# Patient Record
Sex: MALE | Race: BLACK OR AFRICAN AMERICAN | Employment: FULL TIME | URBAN - METROPOLITAN AREA
[De-identification: names, ages, dates, MRNs, and addresses within clinical notes are randomized per-mention and may not be internally consistent; named-entity substitution may affect disease eponyms.]

---

## 2017-04-29 ENCOUNTER — APPOINTMENT (OUTPATIENT)
Dept: GENERAL RADIOLOGY | Age: 22
End: 2017-04-29
Attending: PHYSICIAN ASSISTANT
Payer: SELF-PAY

## 2017-04-29 ENCOUNTER — HOSPITAL ENCOUNTER (EMERGENCY)
Age: 22
Discharge: HOME | End: 2017-04-29
Attending: EMERGENCY MEDICINE
Payer: SELF-PAY

## 2017-04-29 VITALS
SYSTOLIC BLOOD PRESSURE: 159 MMHG | HEIGHT: 72 IN | WEIGHT: 215 LBS | DIASTOLIC BLOOD PRESSURE: 97 MMHG | TEMPERATURE: 98.3 F | BODY MASS INDEX: 29.12 KG/M2 | HEART RATE: 59 BPM | RESPIRATION RATE: 22 BRPM | OXYGEN SATURATION: 100 %

## 2017-04-29 DIAGNOSIS — S43.401A SPRAIN OF RIGHT SHOULDER, UNSPECIFIED SHOULDER SPRAIN TYPE, INITIAL ENCOUNTER: Primary | ICD-10-CM

## 2017-04-29 PROCEDURE — 73030 X-RAY EXAM OF SHOULDER: CPT

## 2017-04-29 PROCEDURE — 73000 X-RAY EXAM OF COLLAR BONE: CPT

## 2017-04-29 PROCEDURE — 74011250637 HC RX REV CODE- 250/637: Performed by: PHYSICIAN ASSISTANT

## 2017-04-29 PROCEDURE — 99283 EMERGENCY DEPT VISIT LOW MDM: CPT

## 2017-04-29 RX ORDER — IBUPROFEN 800 MG/1
800 TABLET ORAL
Qty: 7 TAB | Refills: 0 | Status: SHIPPED | OUTPATIENT
Start: 2017-04-29 | End: 2017-07-13

## 2017-04-29 RX ORDER — HYDROCODONE BITARTRATE AND ACETAMINOPHEN 5; 325 MG/1; MG/1
1 TABLET ORAL
Status: COMPLETED | OUTPATIENT
Start: 2017-04-29 | End: 2017-04-29

## 2017-04-29 RX ADMIN — HYDROCODONE BITARTRATE AND ACETAMINOPHEN 1 TABLET: 5; 325 TABLET ORAL at 22:38

## 2017-04-30 NOTE — ED NOTES
Patient verbalized name and date of birth. Name and date of birth compared to chart and armband to validate information. Patient verbalized that arm armband contains correct spelling of name and date of birth.

## 2017-04-30 NOTE — ED PROVIDER NOTES
Patient is a 22 y.o. male presenting with shoulder pain. The history is provided by the patient. Shoulder Pain    The incident occurred less than 1 hour ago (Acute Rt shoulder pain after hitting someone with shoulder at football. ). The injury mechanism was a direct blow. The right shoulder is affected. The pain is at a severity of 10/10. The pain has been constant since onset. The pain does not radiate. There is no history of shoulder injury. He has no other injuries. There is no history of shoulder surgery. Pertinent negatives include no numbness, no muscle weakness and no tingling. History reviewed. No pertinent past medical history. History reviewed. No pertinent surgical history. History reviewed. No pertinent family history. Social History     Social History    Marital status: SINGLE     Spouse name: N/A    Number of children: N/A    Years of education: N/A     Occupational History    Not on file. Social History Main Topics    Smoking status: Never Smoker    Smokeless tobacco: Not on file    Alcohol use No    Drug use: Yes     Special: Marijuana      Comment: last use 2/1/2015    Sexual activity: Yes     Partners: Female     Birth control/ protection: Condom     Other Topics Concern    Not on file     Social History Narrative         ALLERGIES: Shellfish derived    Review of Systems   Constitutional: Negative for activity change, appetite change, chills, fatigue and fever. HENT: Negative. Respiratory: Negative. Negative for cough and shortness of breath. Cardiovascular: Negative. Negative for chest pain. Gastrointestinal: Negative. Negative for abdominal pain. Musculoskeletal: Positive for arthralgias and myalgias. Negative for back pain, gait problem, joint swelling, neck pain and neck stiffness. Skin: Negative. Negative for color change, pallor, rash and wound. Neurological: Negative for tingling, numbness and headaches. Psychiatric/Behavioral: Negative. Vitals:    04/29/17 2213   BP: (!) 159/97   Pulse: (!) 59   Resp: 22   Temp: 98.3 °F (36.8 °C)   SpO2: 100%   Weight: 97.5 kg (215 lb)   Height: 6' (1.829 m)            Physical Exam   Constitutional: He is oriented to person, place, and time. He appears well-developed and well-nourished. No distress. HENT:   Head: Normocephalic and atraumatic. Right Ear: Hearing and external ear normal.   Left Ear: Hearing and external ear normal.   Nose: Nose normal.   Eyes: Conjunctivae and EOM are normal. Pupils are equal, round, and reactive to light. Neck: Normal range of motion. Neck supple. Pulmonary/Chest: Effort normal. No accessory muscle usage. No respiratory distress. Musculoskeletal:        Right shoulder: He exhibits decreased range of motion, tenderness, bony tenderness and pain. He exhibits no swelling, no effusion, no crepitus, no deformity, no laceration, no spasm, normal pulse and normal strength. Left shoulder: Normal.        Right elbow: Normal.       Right wrist: Normal.        Arms:  Mild deformity/ protrusion of Rt clavicle with significant TTP. Neurological: He is alert and oriented to person, place, and time. No cranial nerve deficit. Skin: Skin is warm, dry and intact. He is not diaphoretic. No pallor. Psychiatric: He has a normal mood and affect. His speech is normal and behavior is normal. Judgment and thought content normal.   Nursing note and vitals reviewed. MDM  Number of Diagnoses or Management Options  Sprain of right shoulder, unspecified shoulder sprain type, initial encounter:   Diagnosis management comments: DDx: fx, dislocation, sprain, strain, contusion    LABORATORY TESTS:  No results found for this or any previous visit (from the past 12 hour(s)). IMAGING RESULTS:  XR CLAVICLE RT   Final Result  FINDINGS: Two views of the right clavicle demonstrate no fracture.     IMPRESSION  IMPRESSION: No acute abnormality.       XR SHOULDER RT AP/LAT MIN 2 V Final Result   FINDINGS: Three views of the right shoulder demonstrate no fracture, dislocation  or other acute abnormality.     IMPRESSION  IMPRESSION: No acute abnormality. MEDICATIONS GIVEN:  Medications  HYDROcodone-acetaminophen (NORCO) 5-325 mg per tablet 1 Tab (1 Tab Oral Given 4/29/17 4059)    IMPRESSION:  Sprain of right shoulder, unspecified shoulder sprain type, initial encounter  (primary encounter diagnosis)    PLAN:  1. Current Discharge Medication List    CONTINUE these medications which have CHANGED    ibuprofen (MOTRIN) 800 mg tablet  Take 1 Tab by mouth every eight (8) hours as needed for Pain for up to 20 doses. Qty: 7 Tab Refills: 0        2. Follow-up Information     Follow up With Details Comments Contact Info    [facility] Schedule an appointment as soon as possible for a visit in 1   week As needed, If symptoms worsen [address] [phone] [phone]    [facility] Schedule an appointment as soon as possible for a   visit in 1 week As needed, If symptoms worsen [address] [facility]   [phone]      Return to ED if worse                  Amount and/or Complexity of Data Reviewed  Tests in the radiology section of CPT®: ordered and reviewed  Tests in the medicine section of CPT®: ordered and reviewed    Patient Progress  Patient progress: stable    ED Course       Procedures    11:29 PM  I have discussed with patient their diagnosis, treatment, and follow up plan. The patient agrees to follow up as outlined in discharge paperwork and also to return to the ED with any worsening.  Sawyer Chen PA-C

## 2017-04-30 NOTE — ED NOTES
Pt presents to ED ambulatory with complaint(s) of right collarbone pain s/p football injury that occurred at approx. 2030 this evening. Pt reports limited movement of right arm. Pt also reports pressure-like pain on right side of chest. Pt denies any fever, nausea, vomiting, or diarrhea. Pt denies taking any medication for pain relief. Upon arrival, pt's collarbones appear assymetrical, right collarbone appears to protrude out more than the left. Pt is alert and oriented x4 and in no apparent distress. Emergency Department Nursing Plan of Care       The Nursing Plan of Care is developed from the Nursing assessment and Emergency Department Attending provider initial evaluation. The plan of care may be reviewed in the ED Provider note.     The Plan of Care was developed with the following considerations:   Patient / Family readiness to learn indicated by:verbalized understanding  Persons(s) to be included in education: patient  Barriers to Learning/Limitations:No    Signed     Joann Coleman RN    4/29/2017   10:22 PM

## 2017-04-30 NOTE — DISCHARGE INSTRUCTIONS
Shoulder Sprain: Care Instructions  Your Care Instructions    A shoulder sprain occurs when you stretch or tear a ligament in your shoulder. Ligaments are tough tissues that connect one bone to another. A sprain can happen during sports, a fall, or projects around the house. Shoulder sprains usually get better with treatment at home. Follow-up care is a key part of your treatment and safety. Be sure to make and go to all appointments, and call your doctor if you are having problems. It's also a good idea to know your test results and keep a list of the medicines you take. How can you care for yourself at home? · Rest and protect your shoulder. Try to stop or reduce any action that causes pain. · If your doctor gave you a sling or immobilizer, wear it as directed. A sling or immobilizer supports your shoulder and may make you more comfortable. · Put ice or a cold pack on your shoulder for 10 to 20 minutes at a time. Try to do this every 1 to 2 hours for the next 3 days (when you are awake) or until the swelling goes down. Put a thin cloth between the ice and your skin. Some doctors suggest alternating between hot and cold. · Be safe with medicines. Read and follow all instructions on the label. ¨ If the doctor gave you a prescription medicine for pain, take it as prescribed. ¨ If you are not taking a prescription pain medicine, ask your doctor if you can take an over-the-counter medicine. · For the first day or two after an injury, avoid things that might increase swelling, such as hot showers, hot tubs, or hot packs. · After 2 or 3 days, if your swelling is gone, apply a heating pad set on low or a warm cloth to your shoulder. This helps keep your shoulder flexible. Some doctors suggest that you go back and forth between hot and cold. Put a thin cloth between the heating pad and your skin. · Follow your doctor's or physical therapist's directions for exercises.   · Return to your usual level of activity slowly. When should you call for help? Call your doctor now or seek immediate medical care if:  · Your pain is worse. · You cannot move your shoulder. · Your arm is cool or pale or changes color below the shoulder. · You have tingling, weakness, or numbness in your arm. Watch closely for changes in your health, and be sure to contact your doctor if:  · You do not get better as expected. Where can you learn more? Go to http://kiley-crista.info/. Enter C076 in the search box to learn more about \"Shoulder Sprain: Care Instructions. \"  Current as of: May 23, 2016  Content Version: 11.2  © 1537-0651 [employer]. Care instructions adapted under license by [employer] (which disclaims liability or warranty for this information). If you have questions about a medical condition or this instruction, always ask your healthcare professional. [address] any warranty or liability for your use of this information.

## 2017-04-30 NOTE — ED NOTES
Discharge Instructions Reviewed with patient. Discharge instructions given to patient per this nurse. patient able to return verbalize discharge instructions. Paper copy of discharge instructions given. 1 RX given to patient per this nurse. Patient condition stable, Respiratory status WNL, Neurostatus intact. patient out of er, to home with friend.

## 2017-07-13 ENCOUNTER — APPOINTMENT (OUTPATIENT)
Dept: GENERAL RADIOLOGY | Age: 22
End: 2017-07-13
Attending: EMERGENCY MEDICINE
Payer: SELF-PAY

## 2017-07-13 ENCOUNTER — HOSPITAL ENCOUNTER (EMERGENCY)
Age: 22
Discharge: HOME | End: 2017-07-13
Attending: EMERGENCY MEDICINE | Admitting: EMERGENCY MEDICINE
Payer: SELF-PAY

## 2017-07-13 VITALS
HEIGHT: 72 IN | DIASTOLIC BLOOD PRESSURE: 86 MMHG | BODY MASS INDEX: 29.12 KG/M2 | SYSTOLIC BLOOD PRESSURE: 140 MMHG | OXYGEN SATURATION: 100 % | WEIGHT: 215 LBS | TEMPERATURE: 98 F | RESPIRATION RATE: 16 BRPM | HEART RATE: 72 BPM

## 2017-07-13 DIAGNOSIS — S93.601A RIGHT FOOT SPRAIN, INITIAL ENCOUNTER: Primary | ICD-10-CM

## 2017-07-13 PROCEDURE — L1902 AFO ANKLE GAUNTLET PRE OTS: HCPCS

## 2017-07-13 PROCEDURE — 99284 EMERGENCY DEPT VISIT MOD MDM: CPT

## 2017-07-13 PROCEDURE — 74011250637 HC RX REV CODE- 250/637: Performed by: EMERGENCY MEDICINE

## 2017-07-13 PROCEDURE — 73630 X-RAY EXAM OF FOOT: CPT

## 2017-07-13 RX ORDER — NAPROXEN 375 MG/1
375 TABLET ORAL 2 TIMES DAILY WITH MEALS
Qty: 20 TAB | Refills: 0 | Status: SHIPPED | OUTPATIENT
Start: 2017-07-13 | End: 2017-08-12

## 2017-07-13 RX ORDER — NAPROXEN 250 MG/1
250 TABLET ORAL ONCE
Status: COMPLETED | OUTPATIENT
Start: 2017-07-13 | End: 2017-07-13

## 2017-07-13 RX ORDER — TRAMADOL HYDROCHLORIDE AND ACETAMINOPHEN 37.5; 325 MG/1; MG/1
1 TABLET ORAL
Qty: 14 TAB | Refills: 0 | Status: SHIPPED | OUTPATIENT
Start: 2017-07-13 | End: 2017-08-12

## 2017-07-13 RX ORDER — TRAMADOL HYDROCHLORIDE 50 MG/1
50 TABLET ORAL
Status: COMPLETED | OUTPATIENT
Start: 2017-07-13 | End: 2017-07-13

## 2017-07-13 RX ADMIN — TRAMADOL HYDROCHLORIDE 50 MG: 50 TABLET, FILM COATED ORAL at 07:52

## 2017-07-13 RX ADMIN — NAPROXEN 250 MG: 250 TABLET ORAL at 07:53

## 2017-07-13 NOTE — LETTER
[facility] EMERGENCY DEPT 
[address] [place] [phone] 
[phone] Work/School Note Date: 7/13/2017 To Whom It May concern: 
 
Rosario Minaya was seen and treated today in the emergency room by the following provider(s): 
Attending Provider: Liza Khanna MD.   
 
Rosario Minaya may return to work on 07/17/2017. Sincerely, Liza Khanna MD

## 2017-07-13 NOTE — Clinical Note
[facility] SYSTEMS Departments For adult and child immunizations, family planning, TB screening, STD testing and women's health services. [facility]: [place] [phone] [employer] [phone] [facility]   [phone] [place] [facility]   [phone] Aysha Porter   [phone] [place]: [facility] [phone] [place] [phone] [place] [phone] [address] For primary care services, woman and child wellness, and some clinic s providing specialty care. [facility] -- [facility]. [address] [place] [address] [phone] [facility] 200 [facility] [phone] [address] [phone] [address] [place] [address] [address] [place] [address]  [phone] [facility]3 [facility] [phone] [facility] 81 [facility] [phone] [facility] 1051 [facility], 9 [facility]: [facility] [address], ext [phone] [facility], [address] [phone] [facility] 37 [phone] [address] 5850 [facility]  [phone] [employer]  [address], [phone] [facility]6 [facility] ([email or web address]) [phone] Sexual Health/Woman Wellness Clinics For STD/HIV testing and treatment, pregnancy testing and services, men's health, birth control services and hepatitis/HPV vaccine services. [facility] 40 [address] [phone] [facility] [address] [phone] [place] [address] [phone] [address] [place] [address] Rd, 5th floor [phone] [facility] [address] [facility] [phone] [facility] [employer] [facility] [employer] [address] [employer] [phone] Specialty Service Clinics 112 [facility] [phone] 6655 [facility]   [phone] Halley Winters 40   [phone] Women, Infant and Children's Services: [address] [phone] [address] [phone] [address] [phone] [facility] Psychiatry     [phone] [facility]   [phone] 8 [facility] [phone] [facility] [phone] SUYAPA Pedro M.D. Gaylyn Heinrich, M.D. Raeann Ruddle, M.D. [address], MD Lovely Lovings, FNP GEORGINA Elam, GEORGINA Ardon MD [facility], NP Mallory Fay, [address] [phone] Rj Ocampo MD [facility] [phone] MD Jose Alfredo Jack MD Cain Jakes, MD Stevenson Coots, MD Clerance Pacini, MD        [phone] Reji Patel MD               [phone] Norman Hoffmann MD      [phone] [facility] [phone] MD Marissa Bennett MD Dickie Polka, MD 
 1059 [facility] [phone]

## 2017-07-13 NOTE — DISCHARGE INSTRUCTIONS
Foot Sprain: Care Instructions  Your Care Instructions    A foot sprain occurs when you stretch or tear the ligaments around your foot. Ligaments are the tough tissues that connect one bone to another. A sprain can happen when you run, fall, or hit your toe against something. Sprains often happen when you jump or change direction quickly. This may occur when you play basketball, soccer, or other sports. Most foot sprains will get better with treatment at home. Follow-up care is a key part of your treatment and safety. Be sure to make and go to all appointments, and call your doctor if you are having problems. It's also a good idea to know your test results and keep a list of the medicines you take. How can you care for yourself at home? · Walk or put weight on your sprained foot as long as it does not hurt. · If your doctor gave you a splint or immobilizer, wear it as directed. If you were given crutches, use them as directed. · For the first 2 days after your injury, avoid hot showers, hot tubs, or hot packs. They may increase swelling. · Put ice or a cold pack on your foot for 10 to 20 minutes at a time to stop swelling. Try this every 1 to 2 hours for 3 days (when you are awake) or until the swelling goes down. Put a thin cloth between the ice pack and your skin. Keep your splint dry. · After 2 or 3 days, if your swelling is gone, put a heating pad (set on low) or a warm cloth on your foot. Some doctors suggest that you go back and forth between hot and cold treatments. · Prop up your foot on a pillow when you ice it or anytime you sit or lie down. Try to keep it above the level of your heart. This will help reduce swelling. · Take pain medicines exactly as directed. ¨ If the doctor gave you a prescription medicine for pain, take it as prescribed. ¨ If you are not taking a prescription pain medicine, ask your doctor if you can take an over-the-counter medicine.   · Do any exercises that your doctor or physical therapist suggests. · Return to your usual exercise gradually as you feel better. When should you call for help? Call your doctor now or seek immediate medical care if:  · You have increased or severe pain. · Your toes are cool or pale or change color. · Your wrap or splint feels too tight. · You have signs of a blood clot, such as:  ¨ Pain in your calf, back of the knee, thigh, or groin. ¨ Redness and swelling in your leg or groin. · You have tingling, weakness, or numbness in your leg or foot. Watch closely for changes in your health, and be sure to contact your doctor if:  · You cannot put any weight on your foot. · You get a fever. · You do not get better as expected. Where can you learn more? Go to http://kiley-crista.info/. Enter H867 in the search box to learn more about \"Foot Sprain: Care Instructions. \"  Current as of: March 21, 2017  Content Version: 11.3  © 9968-7455 [employer]. Care instructions adapted under license by [employer] (which disclaims liability or warranty for this information). If you have questions about a medical condition or this instruction, always ask your healthcare professional. [address] any warranty or liability for your use of this information. Foot Sprain (Metatarsophalangeal Joint): Rehab Exercises  Your Care Instructions  Here are some examples of typical rehabilitation exercises for your condition. Start each exercise slowly. Ease off the exercise if you start to have pain. Your doctor or physical therapist will tell you when you can start these exercises and which ones will work best for you. How to do the exercises  Great toe extension stretch    1. Sit in a chair, and put your affected foot across your other knee. 2. Grasp your heel with one hand, and then slowly pull your big toe back with your other hand.  Pull your toe back toward your ankle until you feel a stretch along the bottom of your foot. 3. Hold the stretch for at least 15 to 30 seconds. 4. Repeat 2 to 4 times. Great toe flexion stretch    1. Sit in a chair, and put your affected foot across your other knee. 2. Grasp your heel with one hand, and then slowly push your big toe down with your other hand. Push your toe down and away from your ankle until you feel a stretch along the top of your foot. 3. Hold the stretch for at least 15 to 30 seconds. 4. Repeat 2 to 4 times. Great toe traction stretch    1. Sit in a chair, and put your affected foot across your other knee. 2. Grasp your heel and the middle part of your foot with one hand. With your other hand, use your thumb and middle finger to grasp your big toe and gently pull it straight out and away from your foot. 3. Hold the stretch for at least 15 to 30 seconds. 4. Repeat 2 to 4 times. Forefoot stretch    1. Sit in a chair with your affected leg bent, and put the heel of your affected foot on the edge of the seat. Or you can sit on the floor with your leg bent and your heel digging into the floor. 2. Grasp your foot with both hands. Your thumbs will be on the top side of your foot just below the joints where your toes connect to your foot. Your other fingers will be underneath your foot. 3. Use the fingers underneath your foot to push up on the two toes that are closest to your big toe. Then use your thumbs and hands to spread your foot and toes outward until you feel a stretch in your foot. The outer edges of your foot will curve downward as you push up on the middle toes. Hold the stretch for at least 15 to 30 seconds. 4. Next, slowly press your thumbs down on the two toes that are closest to your big toe. The outer edges of your foot will curve upward. Hold the stretch for at least 15 to 30 seconds until you feel a stretch in your foot. 5. Repeat 2 to 4 times. Towel scrunches    1.  Sit in a chair, and place both feet on a towel on the floor.  2. Scrunch the towel toward you with your toes. Then use your toes to push the towel back into place. 3. Repeat 8 to 12 times. [place] pick-ups    1. Put some marbles on the floor next to a cup.  2. Sit in a chair, and use the toes of your affected foot to lift up one marble from the floor at a time. Then try to put the marble in the cup.  3. Repeat 8 to 12 times. Follow-up care is a key part of your treatment and safety. Be sure to make and go to all appointments, and call your doctor if you are having problems. It's also a good idea to know your test results and keep a list of the medicines you take. Where can you learn more? Go to http://kiley-crista.info/. Enter Q519 in the search box to learn more about \"Foot Sprain (Metatarsophalangeal Joint): Rehab Exercises. \"  Current as of: March 21, 2017  Content Version: 11.3  © 5388-9266 [employer]. Care instructions adapted under license by [employer] (which disclaims liability or warranty for this information). If you have questions about a medical condition or this instruction, always ask your healthcare professional. [address] any warranty or liability for your use of this information.

## 2017-07-13 NOTE — ED PROVIDER NOTES
HPI Comments: Omar Beard, 25 y.o. male, with no significant PMHx presents ambulatory to [facility] ED with cc of right frontal foot pain and swelling since 2 days ago. Pt states that he was playing basketball 2 days ago but denies any falls, twists, or trauma to the area. He states that the swelling and pain began when he was at home 2 days ago. He denies taking pain medications. He denies any hx of diabetes, arthritis or gout. PCP: None    Social history significant for: - Tobacco, - EtOH, - Illicit Drug Use    There are no other complaints, changes, or physical findings at this time. The history is provided by the patient. No  was used. History reviewed. No pertinent past medical history. History reviewed. No pertinent surgical history. History reviewed. No pertinent family history. Social History     Social History    Marital status: SINGLE     Spouse name: N/A    Number of children: N/A    Years of education: N/A     Occupational History    Not on file. Social History Main Topics    Smoking status: Never Smoker    Smokeless tobacco: Never Used    Alcohol use No    Drug use: No      Comment: last use 2/1/2015    Sexual activity: Yes     Partners: Male     Other Topics Concern    Not on file     Social History Narrative         ALLERGIES: Shellfish derived    Review of Systems   Constitutional: Negative for fever. Respiratory: Negative for shortness of breath. Cardiovascular: Negative for chest pain and leg swelling. Gastrointestinal: Negative for abdominal pain, nausea and vomiting. Musculoskeletal: Positive for arthralgias (right foot), joint swelling (right foot) and myalgias. Negative for back pain. Skin: Negative for color change and rash. Neurological: Negative for weakness and numbness.      Patient Vitals for the past 12 hrs:   Temp Pulse Resp BP SpO2   07/13/17 0738 98 °F (36.7 °C) 72 16 140/86 100 %       Physical Exam Constitutional: He is oriented to person, place, and time. He appears well-developed and well-nourished. Cardiovascular: Normal rate, regular rhythm, normal heart sounds and intact distal pulses. Pulmonary/Chest: Effort normal and breath sounds normal.   Abdominal: Soft. There is no tenderness. Musculoskeletal:   + Right midfoot tenderness over dorsum  Ankle involved, toes not involved  No bruising noted  DP pulses intact  No breaks in skin   Neurological: He is alert and oriented to person, place, and time. Skin: Skin is warm and dry. Nursing note and vitals reviewed. MDM  Number of Diagnoses or Management Options  Diagnosis management comments: DDx: contusion, sprain, fasciitis. Amount and/or Complexity of Data Reviewed  Tests in the radiology section of CPT®: ordered and reviewed  Review and summarize past medical records: yes  Independent visualization of images, tracings, or specimens: yes    Patient Progress  Patient progress: stable    ED Course       Procedures    IMAGING RESULTS:  XR FOOT RT MIN 3 V   Final Result   EXAM:  XR FOOT RT MIN 3 V     INDICATION:   1-2 days of right foot pain, unknown injury.     COMPARISON:  None.     FINDINGS:  Three views of the right foot demonstrate no fracture or other acute  osseous or articular abnormality. The soft tissues are within normal limits.     IMPRESSION  IMPRESSION:  No acute abnormality. MEDICATIONS GIVEN:  Medications   naproxen (NAPROSYN) tablet 250 mg (250 mg Oral Given 7/13/17 0753)   traMADol (ULTRAM) tablet 50 mg (50 mg Oral Given 7/13/17 0752)       IMPRESSION:  1. Right foot sprain, initial encounter        PLAN:  1. Current Discharge Medication List      START taking these medications    Details   traMADol-acetaminophen (ULTRACET) 37.5-325 mg per tablet Take 1 Tab by mouth every six (6) hours as needed for Pain. Max Daily Amount: 4 Tabs.   Qty: 14 Tab, Refills: 0      naproxen (NAPROSYN) 375 mg tablet Take 1 Tab by mouth two (2) times daily (with meals). Qty: 20 Tab, Refills: 0           2. Follow-up Information     Follow up With Details Comments Contact Info    [facility] EMERGENCY DEPT  If symptoms worsen [address] Schedule an appointment as soon as possible for a visit If symptoms worsen [address]  [phone]        Return to ED if worse     Discharge Note:  8:26 AM  The pt is ready for discharge. The pt's signs, symptoms, diagnosis, and discharge instructions have been discussed and pt has conveyed their understanding. The pt is to follow up as recommended or return to ER should their symptoms worsen. Plan has been discussed and pt is in agreement. This note is prepared by Tamika Arvizu, acting as a Scribe for Cam Ontiveros MD.    Cam Ontiveros MD: The scribe's documentation has been prepared under my direction and personally reviewed by me in its entirety. I confirm that the notes above accurately reflects all work, treatment, procedures, and medical decision making performed by me.

## 2017-07-13 NOTE — LETTER
[facility] EMERGENCY DEPT 
[address] [facility] [address] [id number] [phone] Work/School Note Date: 7/13/2017 To Whom It May concern: 
 
Eulalio Espinal was seen and treated today in the emergency room by the following provider(s): 
Attending Provider: Zonia Farr MD.   
 
Eulalio Espinal may return to work on 07/19/17 or sooner if better. Sincerely, Zonia Farr MD

## 2017-07-13 NOTE — ED NOTES
Emergency Department Nursing Plan of Care       The Nursing Plan of Care is developed from the Nursing assessment and Emergency Department Attending provider initial evaluation. The plan of care may be reviewed in the ED Provider note.     The Plan of Care was developed with the following considerations:   Patient / Family readiness to learn indicated by:verbalized understanding  Persons(s) to be included in education: patient  Barriers to Learning/Limitations:No    Signed     Isidro Heart RN    7/13/2017   7:41 AM

## 2017-07-13 NOTE — ED TRIAGE NOTES
Right foot pain x 1-2 days, denies specific injury/trauma, no obvious deformities noted (shoe still on)

## 2017-08-12 ENCOUNTER — APPOINTMENT (OUTPATIENT)
Dept: GENERAL RADIOLOGY | Age: 22
End: 2017-08-12
Attending: INTERNAL MEDICINE
Payer: SELF-PAY

## 2017-08-12 ENCOUNTER — HOSPITAL ENCOUNTER (EMERGENCY)
Age: 22
Discharge: ANOTHER HOSPITAL | End: 2017-08-12
Attending: INTERNAL MEDICINE
Payer: SELF-PAY

## 2017-08-12 VITALS
RESPIRATION RATE: 17 BRPM | OXYGEN SATURATION: 99 % | WEIGHT: 215 LBS | DIASTOLIC BLOOD PRESSURE: 96 MMHG | HEIGHT: 72 IN | HEART RATE: 88 BPM | BODY MASS INDEX: 29.12 KG/M2 | SYSTOLIC BLOOD PRESSURE: 158 MMHG | TEMPERATURE: 98.6 F

## 2017-08-12 DIAGNOSIS — S62.91XB: Primary | ICD-10-CM

## 2017-08-12 PROCEDURE — 74011250636 HC RX REV CODE- 250/636: Performed by: INTERNAL MEDICINE

## 2017-08-12 PROCEDURE — 74011250637 HC RX REV CODE- 250/637: Performed by: INTERNAL MEDICINE

## 2017-08-12 PROCEDURE — 75810000121 HC INCSN/RMVL FB ANY OTHER SITE

## 2017-08-12 PROCEDURE — 74011000250 HC RX REV CODE- 250: Performed by: INTERNAL MEDICINE

## 2017-08-12 PROCEDURE — 73130 X-RAY EXAM OF HAND: CPT

## 2017-08-12 PROCEDURE — 90471 IMMUNIZATION ADMIN: CPT

## 2017-08-12 PROCEDURE — 99284 EMERGENCY DEPT VISIT MOD MDM: CPT

## 2017-08-12 PROCEDURE — 90715 TDAP VACCINE 7 YRS/> IM: CPT | Performed by: INTERNAL MEDICINE

## 2017-08-12 RX ORDER — CEPHALEXIN 250 MG/1
500 CAPSULE ORAL
Status: COMPLETED | OUTPATIENT
Start: 2017-08-12 | End: 2017-08-12

## 2017-08-12 RX ORDER — LIDOCAINE HYDROCHLORIDE 10 MG/ML
10 INJECTION INFILTRATION; PERINEURAL ONCE
Status: COMPLETED | OUTPATIENT
Start: 2017-08-12 | End: 2017-08-12

## 2017-08-12 RX ORDER — FLUTICASONE PROPIONATE 50 MCG
2 SPRAY, SUSPENSION (ML) NASAL DAILY
COMMUNITY
End: 2020-10-19

## 2017-08-12 RX ADMIN — CEPHALEXIN 500 MG: 250 CAPSULE ORAL at 07:04

## 2017-08-12 RX ADMIN — TETANUS TOXOID, REDUCED DIPHTHERIA TOXOID AND ACELLULAR PERTUSSIS VACCINE, ADSORBED 0.5 ML: 5; 2.5; 8; 8; 2.5 SUSPENSION INTRAMUSCULAR at 06:06

## 2017-08-12 RX ADMIN — LIDOCAINE HYDROCHLORIDE 10 ML: 10 INJECTION, SOLUTION INFILTRATION; PERINEURAL at 06:46
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
## 2018-08-16 ENCOUNTER — HOSPITAL ENCOUNTER (EMERGENCY)
Age: 23
Discharge: HOME | End: 2018-08-16
Attending: EMERGENCY MEDICINE
Payer: SELF-PAY

## 2018-08-16 ENCOUNTER — APPOINTMENT (OUTPATIENT)
Dept: GENERAL RADIOLOGY | Age: 23
End: 2018-08-16
Attending: PHYSICIAN ASSISTANT
Payer: SELF-PAY

## 2018-08-16 VITALS
WEIGHT: 201 LBS | SYSTOLIC BLOOD PRESSURE: 122 MMHG | RESPIRATION RATE: 18 BRPM | BODY MASS INDEX: 27.22 KG/M2 | TEMPERATURE: 98 F | HEIGHT: 72 IN | HEART RATE: 75 BPM | DIASTOLIC BLOOD PRESSURE: 72 MMHG | OXYGEN SATURATION: 100 %

## 2018-08-16 DIAGNOSIS — S83.92XA SPRAIN OF LEFT KNEE, UNSPECIFIED LIGAMENT, INITIAL ENCOUNTER: Primary | ICD-10-CM

## 2018-08-16 PROCEDURE — 99283 EMERGENCY DEPT VISIT LOW MDM: CPT

## 2018-08-16 PROCEDURE — 74011250637 HC RX REV CODE- 250/637: Performed by: PHYSICIAN ASSISTANT

## 2018-08-16 PROCEDURE — 73562 X-RAY EXAM OF KNEE 3: CPT

## 2018-08-16 RX ORDER — IBUPROFEN 400 MG/1
800 TABLET ORAL
Status: COMPLETED | OUTPATIENT
Start: 2018-08-16 | End: 2018-08-16

## 2018-08-16 RX ORDER — NAPROXEN 500 MG/1
500 TABLET ORAL
Qty: 20 TAB | Refills: 0 | Status: SHIPPED | OUTPATIENT
Start: 2018-08-16 | End: 2020-08-19

## 2018-08-16 RX ADMIN — IBUPROFEN 800 MG: 400 TABLET ORAL at 19:06

## 2018-08-16 NOTE — ED NOTES
Emergency Department Nursing Plan of Care       The Nursing Plan of Care is developed from the Nursing assessment and Emergency Department Attending provider initial evaluation. The plan of care may be reviewed in the ED Provider note.     The Plan of Care was developed with the following considerations:   Patient / Family readiness to learn indicated by:verbalized understanding  Persons(s) to be included in education: patient  Barriers to Learning/Limitations:No    Signed     Lesley Burnette    8/16/2018   7:04 PM

## 2018-08-16 NOTE — ED PROVIDER NOTES
EMERGENCY DEPARTMENT HISTORY AND PHYSICAL EXAM    Date: 8/16/2018  Patient Name: Jalyn Cabrera    History of Presenting Illness     Chief Complaint   Patient presents with    Knee Injury     left         History Provided By: Patient      HPI: Jalyn Cabrera is a 26 y.o. male with a PMH of No significant past medical history who presents with acute moderate aching Lt knee pain x 2 days secondary to getting hit in the left knee during basketball yesterday. +pop as he fell to the ground. Ambulatory on joint. No medications or alleviating factors. denies fever, chills, n/v, numbness, LROM, other injuries. PCP: None    Current Outpatient Prescriptions   Medication Sig Dispense Refill    naproxen (NAPROSYN) 500 mg tablet Take 1 Tab by mouth two (2) times daily as needed. 20 Tab 0    fluticasone (FLONASE) 50 mcg/actuation nasal spray 2 Sprays by Both Nostrils route daily. Past History     Past Medical History:  History reviewed. No pertinent past medical history. Past Surgical History:  History reviewed. No pertinent surgical history. Family History:  History reviewed. No pertinent family history. Social History:  Social History   Substance Use Topics    Smoking status: Never Smoker    Smokeless tobacco: Never Used    Alcohol use No       Allergies: Allergies   Allergen Reactions    Shellfish Derived Hives         Review of Systems   Review of Systems   Constitutional: Negative for activity change, chills, fatigue and fever. HENT: Negative. Eyes: Negative. Respiratory: Negative. Negative for cough and shortness of breath. Cardiovascular: Negative for chest pain, palpitations and leg swelling. Gastrointestinal: Negative for abdominal pain, diarrhea, nausea and vomiting. Genitourinary: Negative for dysuria. Musculoskeletal: Positive for arthralgias. Negative for back pain, joint swelling, neck pain and neck stiffness. Skin: Negative. Negative for rash and wound. Neurological: Negative. Negative for weakness, numbness and headaches. Psychiatric/Behavioral: Negative. Physical Exam     Vitals:    08/16/18 1853   BP: 122/72   Pulse: 75   Resp: 18   Temp: 98 °F (36.7 °C)   SpO2: 100%   Weight: 91.2 kg (201 lb)   Height: 6' (1.829 m)     Physical Exam   Constitutional: He is oriented to person, place, and time. He appears well-developed and well-nourished. No distress. HENT:   Head: Normocephalic and atraumatic. Right Ear: Hearing and external ear normal.   Left Ear: Hearing and external ear normal.   Nose: Nose normal.   Eyes: Conjunctivae and EOM are normal. Pupils are equal, round, and reactive to light. Neck: Normal range of motion. Pulmonary/Chest: Effort normal. No accessory muscle usage. No respiratory distress. Musculoskeletal: Normal range of motion. Left hip: Normal.        Right knee: Normal.        Left knee: He exhibits bony tenderness. He exhibits normal range of motion, no swelling, no effusion, no ecchymosis, no deformity, no laceration, normal alignment and normal meniscus. Tenderness found. Lateral joint line tenderness noted. Left ankle: Normal.   Neurological: He is alert and oriented to person, place, and time. Skin: Skin is warm, dry and intact. He is not diaphoretic. No pallor. Psychiatric: He has a normal mood and affect. His speech is normal and behavior is normal. Judgment and thought content normal.   Nursing note and vitals reviewed. Diagnostic Study Results     Labs -   No results found for this or any previous visit (from the past 12 hour(s)). Radiologic Studies -   XR KNEE LT 3 V   Final Result        Final result by Rasheed Bradshaw Results In (08/16/18 19:16:25)     Initial Result:     Impression:     IMPRESSION: Normal left knee.        Narrative:     EXAM:  XR KNEE LT 3 V  INDICATION: Left knee pain following injury. COMPARISON: None.     FINDINGS: Three views of the left knee demonstrate no fracture, effusion or  other acute osseous or articular abnormality. The soft tissues are within normal  limits.              CT Results  (Last 48 hours)    None        CXR Results  (Last 48 hours)    None            Medical Decision Making   I am the first provider for this patient. I reviewed the vital signs, available nursing notes, past medical history, past surgical history, family history and social history. Vital Signs-Reviewed the patient's vital signs. Records Reviewed: Nursing Notes, Old Medical Records and Previous Radiology Studies    ED Course:     Disposition:    DISCHARGE NOTE:   7:22 PM      Care plan outlined and precautions discussed. Patient has no new complaints, changes, or physical findings. Results of xray were reviewed with the patient. All medications were reviewed with the patient; will d/c home with naproxen. All of pt's questions and concerns were addressed. Patient was instructed and agrees to follow up with Ortho, as well as to return to the ED upon further deterioration. Patient is ready to go home. Follow-up Information     Follow up With Details Comments [address] Schedule an appointment as soon as possible for a visit in 1 week As needed, If symptoms worsen [address]  [address] [address]  [address] [facility]  [phone]          Current Discharge Medication List      START taking these medications    Details   naproxen (NAPROSYN) 500 mg tablet Take 1 Tab by mouth two (2) times daily as needed. Qty: 20 Tab, Refills: 0         CONTINUE these medications which have NOT CHANGED    Details   fluticasone (FLONASE) 50 mcg/actuation nasal spray 2 Sprays by Both Nostrils route daily. Provider Notes (Medical Decision Making):   DDx: sprain, strain, contusion, fx, dislocation    Procedures:  Procedures        Diagnosis     Clinical Impression:   1.  Sprain of left knee, unspecified ligament, initial encounter

## 2018-08-16 NOTE — DISCHARGE INSTRUCTIONS
Knee Sprain: Care Instructions  Your Care Instructions    A knee sprain is one or more stretched, partly torn, or completely torn knee ligaments. Ligaments are bands of ropelike tissue that connect bone to bone and make the knee stable. The knee has four main ligaments. Knee sprains often happen because of a twisting or bending injury from sports such as skiing, basketball, soccer, or football. The knee turns one way while the lower or upper leg goes another way. A sprain also can happen when the knee is hit from the side or the front. If a knee ligament is slightly stretched, you will probably need only home treatment. You may need a splint or brace (immobilizer) for a partly torn ligament. A complete tear may need surgery. A minor knee sprain may take up to 6 weeks to heal, while a severe sprain may take months. Follow-up care is a key part of your treatment and safety. Be sure to make and go to all appointments, and call your doctor if you are having problems. It's also a good idea to know your test results and keep a list of the medicines you take. How can you care for yourself at home? · Follow instructions about how much weight you can put on your leg and how to walk with crutches. · Prop up your leg on a pillow when you ice it or anytime you sit or lie down for the next 3 days. Try to keep it above the level of your heart. This will help reduce swelling. · Put ice or a cold pack on your knee for 10 to 20 minutes at a time. Try to do this every 1 to 2 hours for the next 3 days (when you are awake) or until the swelling goes down. Put a thin cloth between the ice and your skin. Do not get the splint wet. · If you have an elastic bandage, make sure it is snug but not so tight that your leg is numb, tingles, or swells below the bandage. You can loosen the bandage if it is too tight. · Your doctor may recommend a brace (immobilizer) to support your knee while it heals. Wear it as directed.   · Ask your doctor if you can take an over-the-counter pain medicine, such as acetaminophen (Tylenol), ibuprofen (Advil, Motrin), or naproxen (Aleve). Be safe with medicines. Read and follow all instructions on the label. When should you call for help? Call 911 anytime you think you may need emergency care. For example, call if:    · You have sudden chest pain and shortness of breath, or you cough up blood.    Call your doctor now or seek immediate medical care if:    · You have increased or severe pain.     · You cannot move your toes or ankle.     · Your foot is cool or pale or changes color.     · You have tingling, weakness, or numbness in your foot or leg.     · Your splint or brace feels too tight.     · You are unable to straighten the knee, or the knee \"locks. \"     · You have signs of a blood clot in your leg, such as:  ¨ Pain in your calf, back of the knee, thigh, or groin. ¨ Redness and swelling in your leg.    Watch closely for changes in your health, and be sure to contact your doctor if:    · Your pain is not getting better or is getting worse. Where can you learn more? Go to http://kiley-crista.info/. Enter N406 in the search box to learn more about \"Knee Sprain: Care Instructions. \"  Current as of: November 29, 2017  Content Version: 11.7  © 3612-4652 [employer]. Care instructions adapted under license by [employer] (which disclaims liability or warranty for this information). If you have questions about a medical condition or this instruction, always ask your healthcare professional. [address] any warranty or liability for your use of this information.

## 2020-08-19 ENCOUNTER — APPOINTMENT (OUTPATIENT)
Dept: GENERAL RADIOLOGY | Age: 25
End: 2020-08-19
Attending: PHYSICIAN ASSISTANT
Payer: MEDICAID

## 2020-08-19 ENCOUNTER — HOSPITAL ENCOUNTER (EMERGENCY)
Age: 25
Discharge: HOME | End: 2020-08-19
Attending: EMERGENCY MEDICINE
Payer: MEDICAID

## 2020-08-19 VITALS
OXYGEN SATURATION: 99 % | SYSTOLIC BLOOD PRESSURE: 137 MMHG | TEMPERATURE: 98.5 F | HEART RATE: 77 BPM | RESPIRATION RATE: 18 BRPM | BODY MASS INDEX: 29.16 KG/M2 | WEIGHT: 220 LBS | HEIGHT: 73 IN | DIASTOLIC BLOOD PRESSURE: 92 MMHG

## 2020-08-19 DIAGNOSIS — S86.891A AVULSION OF PATELLAR TENDON, RIGHT, INITIAL ENCOUNTER: Primary | ICD-10-CM

## 2020-08-19 DIAGNOSIS — M92.523 OSGOOD-SCHLATTER'S DISEASE OF BOTH KNEES: ICD-10-CM

## 2020-08-19 PROCEDURE — 73562 X-RAY EXAM OF KNEE 3: CPT

## 2020-08-19 PROCEDURE — 99283 EMERGENCY DEPT VISIT LOW MDM: CPT

## 2020-08-19 RX ORDER — NAPROXEN 500 MG/1
500 TABLET ORAL
Qty: 20 TAB | Refills: 0 | OUTPATIENT
Start: 2020-08-19 | End: 2020-10-19

## 2020-08-19 NOTE — ED NOTES
Emergency Department Nursing Plan of Care       The Nursing Plan of Care is developed from the Nursing assessment and Emergency Department Attending provider initial evaluation. The plan of care may be reviewed in the ED Provider note.     The Plan of Care was developed with the following considerations:   Patient / Family readiness to learn indicated by:verbalized understanding  Persons(s) to be included in education: patient  Barriers to Learning/Limitations:No    Signed     Alexandria Rodriguez RN    8/19/2020   2:43 PM

## 2020-08-19 NOTE — ED PROVIDER NOTES
EMERGENCY DEPARTMENT HISTORY AND PHYSICAL EXAM      Date: 8/19/2020  Patient Name: Eleni Green    History of Presenting Illness     Chief Complaint   Patient presents with    Knee Pain     History Provided By: Patient    HPI: Eleni Green, 26 y.o. male with no chronic medical history who presents via private vehicle to the ED with cc of acute on chronic moderate aching left greater than right bilateral knee pain X 1 year with exacerbation a couple of days ago while playing flag football. Denies blunt injury or trauma. States that he felt like his left knee popped. No medications alleviating factors. Denies fever, chills, nausea, vomiting, numbness, tingling, focal weakness, gait abnormalities. PCP: None    There are no other complaints, changes, or physical findings at this time. No current facility-administered medications on file prior to encounter. Current Outpatient Medications on File Prior to Encounter   Medication Sig Dispense Refill    [DISCONTINUED] naproxen (NAPROSYN) 500 mg tablet Take 1 Tab by mouth two (2) times daily as needed. 20 Tab 0    fluticasone (FLONASE) 50 mcg/actuation nasal spray 2 Sprays by Both Nostrils route daily. Past History     Past Medical History:  History reviewed. No pertinent past medical history. Past Surgical History:  History reviewed. No pertinent surgical history. Family History:  History reviewed. No pertinent family history. Social History:  Social History     Tobacco Use    Smoking status: Never Smoker    Smokeless tobacco: Never Used   Substance Use Topics    Alcohol use: No    Drug use: No     Comment: last use 2/1/2015     Allergies: Allergies   Allergen Reactions    Shellfish Derived Hives     Review of Systems   Review of Systems   Constitutional: Negative for activity change, chills, fatigue and fever. HENT: Negative. Eyes: Negative. Respiratory: Negative. Negative for cough and shortness of breath. Cardiovascular: Negative for chest pain, palpitations and leg swelling. Gastrointestinal: Negative for abdominal pain, diarrhea, nausea and vomiting. Genitourinary: Negative for dysuria. Musculoskeletal: Positive for arthralgias. Negative for back pain, joint swelling, neck pain and neck stiffness. Skin: Negative. Negative for rash and wound. Neurological: Negative. Negative for weakness, numbness and headaches. Psychiatric/Behavioral: Negative. Physical Exam   Physical Exam  Vitals signs and nursing note reviewed. Constitutional:       General: He is not in acute distress. Appearance: Normal appearance. He is well-developed. He is not ill-appearing, toxic-appearing or diaphoretic. HENT:      Head: Normocephalic and atraumatic. Right Ear: Hearing and external ear normal.      Left Ear: Hearing and external ear normal.      Nose: Nose normal.   Eyes:      Conjunctiva/sclera: Conjunctivae normal.      Pupils: Pupils are equal, round, and reactive to light. Neck:      Musculoskeletal: Normal range of motion. Cardiovascular:      Pulses:           Posterior tibial pulses are 2+ on the right side and 2+ on the left side. Pulmonary:      Effort: Pulmonary effort is normal. No accessory muscle usage or respiratory distress. Musculoskeletal: Normal range of motion. Right hip: Normal.      Left hip: Normal.      Right knee: He exhibits normal range of motion, no swelling, no effusion, no ecchymosis, no deformity, no laceration, no erythema, normal alignment, no LCL laxity, normal patellar mobility, no bony tenderness, normal meniscus and no MCL laxity. No tenderness found. Left knee: He exhibits normal range of motion, no swelling, no effusion, no ecchymosis, no deformity, no laceration, no erythema, normal alignment, no LCL laxity, normal patellar mobility, no bony tenderness, normal meniscus and no MCL laxity. No tenderness found.       Right ankle: Normal.      Left ankle: Normal.   Skin:     General: Skin is warm and dry. Coloration: Skin is not pale. Neurological:      Mental Status: He is alert and oriented to person, place, and time. Psychiatric:         Speech: Speech normal.         Behavior: Behavior normal.         Thought Content: Thought content normal.         Judgment: Judgment normal.       Diagnostic Study Results   Labs -   No results found for this or any previous visit (from the past 12 hour(s)). Radiologic Studies -   XR KNEE LT 3 V   Final Result   IMPRESSION: Infrapatellar soft tissue swelling. No acute osseous abnormality. XR KNEE RT 3 V   Final Result   IMPRESSION: Avulsion fracture of the inferior pole of the right patella is   suspected. . Correlate with mechanism of injury and physical examination. Xr Knee Lt 3 V    Result Date: 8/19/2020  IMPRESSION: Infrapatellar soft tissue swelling. No acute osseous abnormality. Xr Knee Rt 3 V    Result Date: 8/19/2020  IMPRESSION: Avulsion fracture of the inferior pole of the right patella is suspected. . Correlate with mechanism of injury and physical examination. Medical Decision Making   I am the first provider for this patient. I reviewed the vital signs, available nursing notes, past medical history, past surgical history, family history and social history. Vital Signs-Reviewed the patient's vital signs. Patient Vitals for the past 24 hrs:   Temp Pulse Resp BP SpO2   08/19/20 1436 98.5 °F (36.9 °C) 77 18 (!) 137/92 99 %     Pulse Oximetry Analysis - 99% on RA and normal    Records Reviewed: Nursing Notes, Old Medical Records, Previous Radiology Studies and Previous Laboratory Studies    Provider Notes (Medical Decision Making):   52-year-old male presents with left greater than right bilateral knee pain X 1 year with exacerbation 2 days ago while playing flag football. Differential includes tendinitis, sprain, strain, fracture, dislocation, arthritis, bursitis.   Will obtain imaging and treat symptoms as needed. ED Course:   Initial assessment performed. The patients presenting problems have been discussed, and they are in agreement with the care plan formulated and outlined with them. I have encouraged them to ask questions as they arise throughout their visit. Progress Note:   Updated pt on all returned results and findings. Discussed the importance of proper follow up as referred below along with return precautions. Pt in agreement with the care plan and expresses agreement with and understanding of all items discussed. Disposition:  4:17 PM  I have discussed with patient their diagnosis, treatment, and follow up plan. The patient agrees to follow up as outlined in discharge paperwork and also to return to the ED with any worsening. Carlos Jones PA-C      PLAN:  1. Current Discharge Medication List      CONTINUE these medications which have CHANGED    Details   naproxen (NAPROSYN) 500 mg tablet Take 1 Tab by mouth two (2) times daily as needed for Pain. Qty: 20 Tab, Refills: 0         CONTINUE these medications which have NOT CHANGED    Details   fluticasone (FLONASE) 50 mcg/actuation nasal spray 2 Sprays by Both Nostrils route daily. 2.   Follow-up Information     Follow up With Specialties Details Why Contact Info    [facility]  Schedule an appointment as soon as possible for a visit in 1 week As needed, If symptoms worsen [address]  [phone]        Return to ED if worse     Diagnosis     Clinical Impression:   1. Avulsion of patellar tendon, right, initial encounter    2. Osgood-Schlatter's disease of both knees            Please note that this dictation was completed with Dragon, computer voice recognition software. Quite often unanticipated grammatical, syntax, homophones, and other interpretive errors are inadvertently transcribed by the computer software.   Please disregard these errors. Additionally, please excuse any errors that have escaped final proofreading.

## 2020-08-19 NOTE — DISCHARGE INSTRUCTIONS
Patient Education        Osgood-Schlatter Disease in Children: Care Instructions  Your Care Instructions     Osgood-Schlatter disease is a common problem for older children and teenagers. It usually happens when a child is growing a lot and his or her leg bones get longer. This problem causes pain and swelling in the shinbone below the knee (patella). It can happen in one or both legs. The pain may come and go. In some cases, it lasts more than a year. It usually stops when your child stops growing a lot. After it stops, your child may have a painless bump on his or her bones. There are things your child can do to feel better. Rest can help. So can limiting other sports and activities that put pressure on the knee. Your doctor may also recommend ice, pain medicine, or leg stretches. Follow-up care is a key part of your child's treatment and safety. Be sure to make and go to all appointments, and call your doctor if your child is having problems. It's also a good idea to know your child's test results and keep a list of the medicines your child takes. How can you care for your child at home? · When your child has pain, rest the sore leg. · Put ice or a cold pack on the knee for 10 to 20 minutes at a time. Put a thin cloth between the ice and your child's skin. · Give acetaminophen (Tylenol) or ibuprofen (Advil, Motrin) for pain. Read and follow all instructions on the label. Do not give two or more pain medicines at the same time unless the doctor told you to. Many pain medicines have acetaminophen, which is Tylenol. Too much acetaminophen (Tylenol) can be harmful. · It is okay for your child to play sports and be active. This will not cause any long-term problems. But if those sports or activities cause pain, your child may want to try ones that don't put pressure on the knee. Good examples are swimming, walking, and biking.   · Have your child wear knee pads or patellar straps when he or she plays sports or does activities that put pressure on the knee. · Simple stretches before activities will help keep your child's legs flexible. Here are two that may help:  ? Quadriceps stretch: Your child lies on his or her side with one hand supporting the head. He or she bends the upper leg back and grabs the ankle with the hand. Then your child stretches the leg back. Hold the stretch at least 15 to 30 seconds, and repeat 2 to 4 times. Then your child should change sides and stretch the other leg.  ? Hamstring stretch: Your child sits on the floor with the right leg extended out straight, the knee slightly bent, and the toes pointing toward the head. He or she bends the left leg so that the left foot is next to the inside of the right thigh. He or she leans forward from the hips, and reaches for the right ankle. Your child should not try to touch his or her forehead to the knee. Hold the stretch at least 15 to 30 seconds, and repeat 2 to 4 times. Then your child should change sides and stretch the other leg. When should you call for help? Call your doctor now or seek immediate medical care if:  · Your child has increased or severe pain. Watch closely for changes in your child's health, and be sure to contact your doctor if:  · Your child does not get better as expected. Where can you learn more? Go to http://www.gray.com/  Enter A914 in the search box to learn more about \"Osgood-Schlatter Disease in Children: Care Instructions. \"  Current as of: March 2, 2020               Content Version: 12.5  © 2006-2020 [employer]. Care instructions adapted under license by [employer] (which disclaims liability or warranty for this information). If you have questions about a medical condition or this instruction, always ask your healthcare professional. [place] [address] any warranty or liability for your use of this information.          Patient Education Osgood-Schlatter Disease: Exercises  Introduction  Here are some examples of exercises for you to try. The exercises may be suggested for a condition or for rehabilitation. Start each exercise slowly. Ease off the exercises if you start to have pain. You will be told when to start these exercises and which ones will work best for you. How to do the exercises  Straight-leg raises to the front   1. Lie on your back with your good knee bent so that your foot rests flat on the floor. Your affected leg should be straight. Make sure that your low back has a normal curve. You should be able to slip your hand in between the floor and the small of your back, with your palm touching the floor and your back touching the back of your hand. 2. Tighten the thigh muscles in your affected leg by pressing the back of your knee flat down to the floor. Hold your knee straight. 3. Keeping the thigh muscles tight and your leg straight, lift your leg up so that your heel is about 12 inches off the floor. 4. Hold for about 6 seconds, then lower your leg slowly. Rest for up to 10 seconds between repetitions. 5. Repeat 8 to 12 times. Short-arc quad   1. Lie on your back with your knees bent over a foam roll or large rolled-up towel and your heels on the floor. 2. Lift the lower part of your affected leg until your leg is straight. Keep the back of your knee on the foam roll or towel. 3. Hold your leg straight for about 6 seconds, then slowly bend your knee and lower your heel back to the floor. Rest for up to 10 seconds between repetitions. 4. Repeat 8 to 12 times. Half-squat with knees and feet turned out to the side   1. Stand with your feet about shoulder-width apart and turned out to the side about 45 degrees. 2. Keep your back straight, and tighten your buttocks. 3. Slowly bend your knees to lower your body about one-quarter of the way down toward the floor.  Try to keep your back straight at all times, and do not let your pelvis tilt forward or your knees extend beyond the tip of your toes. 4. Repeat 8 to 12 times. Step up   1. Place a single-step footstool on the floor. If you do not have a footstool, you can use a thick book, such as a phone book, a dictionary, or an encyclopedia. If you are not steady on your feet, hold on to a chair, counter, or wall while you do this exercise. 2. Keeping your back straight, step up with your affected leg. Try not to push off your back leg as you step up. Only use your affected leg to bring you up on to the step. Then lift your other leg on to the step. As you step up, try to keep your knee moving in a straight line with your middle toe. 3. Move back to the starting position, with both feet on the floor. 4. Repeat 8 to 12 times. Step down   1. Stand on a single-step footstool. If you do not have a footstool, you can use a thick book, such as a phone book, a dictionary, or an encyclopedia. If you are not steady on your feet, hold on to a chair, counter, or wall while you do this exercise. 2. Slowly step down with your good leg, allowing your heel to lightly touch the floor. As you step down, try to keep your affected knee moving in a straight line toward your middle toe. 3. Move back to the starting position, with both feet on the footstool or book. 4. Repeat 8 to 12 times. Terminal knee extension   1. Tie the ends of an exercise band together to form a loop. Attach one end of the loop to a secure object, or shut a door on it to hold it in place. (Or you can have someone hold one end of the loop to provide resistance.)  2. Loop the other end of the exercise band around the knee of your affected leg. Keep that leg somewhat bent at the knee. 3. Put your good leg about a step behind your affected leg. Then slowly straighten your affected leg by tightening the thigh muscles of that leg.   4. Hold for about 6 seconds, then return to the starting position with your knee somewhat bent.  5. Rest for up to 10 seconds. 6. Repeat 8 to 12 times. Follow-up care is a key part of your treatment and safety. Be sure to make and go to all appointments, and call your doctor if you are having problems. It's also a good idea to know your test results and keep a list of the medicines you take. Where can you learn more? Go to http://www.gray.com/  Enter F521 in the search box to learn more about \"Osgood-Schlatter Disease: Exercises. \"  Current as of: March 2, 2020               Content Version: 12.5  © 4611-6657 [employer]. Care instructions adapted under license by [employer] (which disclaims liability or warranty for this information). If you have questions about a medical condition or this instruction, always ask your healthcare professional. [address] any warranty or liability for your use of this information. Patient Education        Broken Kneecap: Care Instructions  Your Care Instructions     The kneecap (patella) is a bone that protects the front of your knee joint. It takes the brunt of any blows to your knee, such as a fall onto your knee or your knee hitting the dashboard. Symptoms of a broken kneecap (fracture) are swelling and pain, especially when moving the knee back and forth. You may not need surgery if the fracture has not moved your kneecap out of position. But sometimes surgery is needed to move the pieces of the kneecap back where they belong and to repair damage. Whether or not you have surgery, you probably will wear a cast or immobilizer on your leg for several weeks while the kneecap heals. Wear and take care of the cast or immobilizer exactly as your doctor advises. You may need to ask for help with daily tasks. You heal best when you take good care of yourself. Eat a variety of healthy foods, and don't smoke. Follow-up care is a key part of your treatment and safety.  Be sure to make and go to all appointments, and call your doctor if you are having problems. It's also a good idea to know your test results and keep a list of the medicines you take. How can you care for yourself at home? · Follow your doctor's instructions for taking care of your cast or immobilizer, which is a protective brace that keeps your knee from moving. Do not remove it until your doctor says you can. · Prop up the sore leg on a pillow when you ice it or anytime you sit or lie down during the next 3 days. Try to keep it above the level of your heart. This will help reduce swelling. · Put ice or a cold pack on your knee for 10 to 20 minutes at a time. Try to do this every 1 to 2 hours for the next 3 days (when you are awake) or until the swelling goes down. Put a thin cloth between the ice and your skin. Be careful not to get the cast or immobilizer wet. · Do not use oils or lotions near your cast. If the skin becomes red or sore around the edge of the cast, you may pad the edges with a soft material, such as moleskin, or use tape to cover the edges. · Be safe with medicines. Read and follow all instructions on the label. ? If the doctor gave you a prescription medicine for pain, take it as prescribed. ? If you are not taking a prescription pain medicine, ask your doctor if you can take an over-the-counter medicine. When should you call for help? [id number] anytime you think you may need emergency care. For example, call if:  · You have chest pain, are short of breath, or you cough up blood. · You are very sleepy and you have trouble waking up. Call your doctor now or seek immediate medical care if:  · You have new or worse nausea or vomiting. · You have new or worse pain. · Your foot is cool or pale or changes color. · You have tingling, weakness, or numbness in your toes. · Your cast or splint feels too tight.   · You have signs of a blood clot in your leg (called a deep vein thrombosis), such as:  ? Pain in your calf, back of the knee, thigh, or groin. ? Redness or swelling in your leg. Watch closely for changes in your health, and be sure to contact your doctor if:  · You have a problem with your splint or cast.  · You do not get better as expected. Where can you learn more? Go to http://kiley-crista.info/  Enter D406 in the search box to learn more about \"Broken Kneecap: Care Instructions. \"  Current as of: March 2, 2020               Content Version: 12.5  © 8204-5068 [employer]. Care instructions adapted under license by [employer] (which disclaims liability or warranty for this information). If you have questions about a medical condition or this instruction, always ask your healthcare professional. [address] any warranty or liability for your use of this information.

## 2020-08-19 NOTE — LETTER
[facility] EMERGENCY DEPT 
[address] [id number] 
[phone] Work/School Note Date: 8/19/2020 To Whom It May concern: 
 
Swati Riley was seen and treated today in the emergency room by the following provider(s): 
Attending Provider: Dagoberto Barkley MD 
Physician Assistant: Melissa Parra PA-C. Swati Riley may return to work on 08/27/2020. Sincerely, Gurmeet Montoya RN

## 2020-08-19 NOTE — ED TRIAGE NOTES
Pt reports he was playing football and heard a \"pop\" to left knee.  Pt reports pain to bilateral knees

## 2020-10-19 ENCOUNTER — HOSPITAL ENCOUNTER (EMERGENCY)
Age: 25
Discharge: HOME | End: 2020-10-19
Attending: EMERGENCY MEDICINE
Payer: MEDICAID

## 2020-10-19 VITALS
HEART RATE: 99 BPM | BODY MASS INDEX: 29.09 KG/M2 | WEIGHT: 219.5 LBS | HEIGHT: 73 IN | OXYGEN SATURATION: 95 % | RESPIRATION RATE: 18 BRPM | DIASTOLIC BLOOD PRESSURE: 74 MMHG | TEMPERATURE: 99.2 F | SYSTOLIC BLOOD PRESSURE: 107 MMHG

## 2020-10-19 DIAGNOSIS — J06.9 ACUTE URI: Primary | ICD-10-CM

## 2020-10-19 DIAGNOSIS — Z20.822 ENCOUNTER FOR LABORATORY TESTING FOR COVID-19 VIRUS: ICD-10-CM

## 2020-10-19 PROCEDURE — 99282 EMERGENCY DEPT VISIT SF MDM: CPT

## 2020-10-19 PROCEDURE — 87635 SARS-COV-2 COVID-19 AMP PRB: CPT

## 2020-10-19 RX ORDER — BENZONATATE 100 MG/1
100 CAPSULE ORAL
Qty: 21 CAP | Refills: 0 | Status: SHIPPED | OUTPATIENT
Start: 2020-10-19 | End: 2020-10-26

## 2020-10-19 RX ORDER — FLUTICASONE PROPIONATE 50 MCG
2 SPRAY, SUSPENSION (ML) NASAL DAILY
Qty: 1 BOTTLE | Refills: 0 | Status: SHIPPED | OUTPATIENT
Start: 2020-10-19 | End: 2021-10-02

## 2020-10-19 RX ORDER — GUAIFENESIN 600 MG/1
600 TABLET, EXTENDED RELEASE ORAL 2 TIMES DAILY
Qty: 14 TAB | Refills: 0 | Status: SHIPPED | OUTPATIENT
Start: 2020-10-19 | End: 2021-10-02

## 2020-10-19 NOTE — ED PROVIDER NOTES
EMERGENCY DEPARTMENT HISTORY AND PHYSICAL EXAM    Date: 10/19/2020  Patient Name: Hanna Metzger    History of Presenting Illness     Chief Complaint   Patient presents with    Cold Symptoms         History Provided By: Patient      HPI: Hanna Metzger is a 26 y.o. male with a PMH of No significant past medical history who presents with cold symptoms. Onset two days ago. Associated symptoms includes cough, wheezing, runny nose and chills. Reports concern for COVID due to exposure at work. Denies any shortness of breath, sore throat, fever, sinus pressure, nausea, vomiting or diarrhea. He has not tried anything for symptoms. PCP: None    Current Outpatient Medications   Medication Sig Dispense Refill    benzonatate (Tessalon Perles) 100 mg capsule Take 1 Cap by mouth three (3) times daily as needed for Cough for up to 7 days. 21 Cap 0    guaiFENesin ER (MUCINEX) 600 mg ER tablet Take 1 Tab by mouth two (2) times a day. 14 Tab 0    fluticasone propionate (Flonase) 50 mcg/actuation nasal spray 2 Sprays by Both Nostrils route daily. 1 Bottle 0       Past History     Past Medical History:  History reviewed. No pertinent past medical history. Past Surgical History:  History reviewed. No pertinent surgical history. Family History:  History reviewed. No pertinent family history. Social History:  Social History     Tobacco Use    Smoking status: Never Smoker    Smokeless tobacco: Never Used   Substance Use Topics    Alcohol use: No    Drug use: No     Comment: last use 2/1/2015       Allergies: Allergies   Allergen Reactions    Shellfish Derived Hives         Review of Systems   Review of Systems   Constitutional: Positive for chills. Negative for fatigue and fever. HENT: Positive for rhinorrhea. Negative for postnasal drip, sinus pressure, sinus pain, sneezing and sore throat. Eyes: Negative for discharge and itching. Respiratory: Positive for cough and wheezing.  Negative for shortness of breath. Cardiovascular: Negative for chest pain. Gastrointestinal: Negative for abdominal pain, diarrhea, nausea and vomiting. Musculoskeletal: Negative for myalgias. Allergic/Immunologic: Negative for environmental allergies. Neurological: Negative for dizziness, weakness, light-headedness and headaches. All other systems reviewed and are negative. Physical Exam     Vitals:    10/19/20 1423   BP: 107/74   Pulse: 99   Resp: 18   Temp: 99.2 °F (37.3 °C)   SpO2: 95%   Weight: 99.6 kg (219 lb 8 oz)   Height: 6' 1\" (1.854 m)     Physical Exam  Vitals signs and nursing note reviewed. Constitutional:       General: He is not in acute distress. Appearance: Normal appearance. He is normal weight. He is not ill-appearing, toxic-appearing or diaphoretic. HENT:      Head: Normocephalic and atraumatic. Right Ear: Hearing, tympanic membrane, ear canal and external ear normal.      Left Ear: Hearing, tympanic membrane, ear canal and external ear normal.      Nose: Rhinorrhea present. No congestion. Right Sinus: No frontal sinus tenderness. Left Sinus: No frontal sinus tenderness. Mouth/Throat:      Lips: Pink. Mouth: Mucous membranes are moist.      Pharynx: Oropharynx is clear. Eyes:      Extraocular Movements: Extraocular movements intact. Conjunctiva/sclera: Conjunctivae normal.      Pupils: Pupils are equal, round, and reactive to light. Neck:      Musculoskeletal: Normal range of motion. Cardiovascular:      Rate and Rhythm: Normal rate and regular rhythm. Pulses: Normal pulses. Heart sounds: Normal heart sounds. Pulmonary:      Effort: Pulmonary effort is normal.      Breath sounds: Normal breath sounds. No decreased breath sounds or wheezing. Abdominal:      General: Abdomen is flat. Bowel sounds are normal. There is no distension. Palpations: Abdomen is soft. There is no mass. Tenderness: There is no abdominal tenderness.  There is no guarding or rebound. Musculoskeletal: Normal range of motion. General: No swelling, tenderness or deformity. Skin:     General: Skin is warm and dry. Capillary Refill: Capillary refill takes less than 2 seconds. Neurological:      General: No focal deficit present. Mental Status: He is alert and oriented to person, place, and time. Psychiatric:         Mood and Affect: Mood normal.         Behavior: Behavior normal. Behavior is cooperative. Thought Content: Thought content normal.           Diagnostic Study Results     Labs -     Recent Results (from the past 12 hour(s))   SARS-COV-2    Collection Time: 10/19/20  3:49 PM   Result Value Ref Range    Specimen source Nasopharyngeal      SARS-CoV-2 PENDING     SARS-CoV-2 PENDING     Specimen source Nasopharyngeal      COVID-19 rapid test PENDING     Specimen type NP Swab      Health status PENDING     COVID-19 PENDING        Radiologic Studies -   No orders to display     CT Results  (Last 48 hours)    None        CXR Results  (Last 48 hours)    None            Medical Decision Making   I am the first provider for this patient. I reviewed the vital signs, available nursing notes, past medical history, past surgical history, family history and social history. Vital Signs-Reviewed the patient's vital signs. Records Reviewed: Nursing Notes, Old Medical Records, Previous Radiology Studies and Previous Laboratory Studies          DDX:  seasonal allergies, URI, COVID , sinusitis     Disposition:  Discharge   DISCHARGE NOTE:         Care plan outlined and precautions discussed. Patient has no new complaints, changes, or physical findings. All of pt's questions and concerns were addressed. Patient was instructed and agrees to follow up with PCP, as well as to return to the ED upon further deterioration. Patient is ready to go home.     Follow-up Information     Follow up With Specialties Details Why [address] [facility] EMERGENCY DEPT Emergency Medicine In 3 days If symptoms worsen Alex Zapien  [phone]          Discharge Medication List as of 10/19/2020  3:48 PM      START taking these medications    Details   benzonatate (Tessalon Perles) 100 mg capsule Take 1 Cap by mouth three (3) times daily as needed for Cough for up to 7 days. , Normal, Disp-21 Cap,R-0      guaiFENesin ER (MUCINEX) 600 mg ER tablet Take 1 Tab by mouth two (2) times a day., Normal, Disp-14 Tab,R-0         CONTINUE these medications which have CHANGED    Details   fluticasone propionate (Flonase) 50 mcg/actuation nasal spray 2 Sprays by Both Nostrils route daily. , Normal, Disp-1 Bottle,R-0         STOP taking these medications       naproxen (NAPROSYN) 500 mg tablet Comments:   Reason for Stopping:                 Procedures:  Procedures    Please note that this dictation was completed with Dragon, computer voice recognition software. Quite often unanticipated grammatical, syntax, homophones, and other interpretive errors are inadvertently transcribed by the computer software. Please disregard these errors. Additionally, please excuse any errors that have escaped final proofreading. Diagnosis     Clinical Impression:   1. Acute URI    2.  Encounter for laboratory testing for COVID-19 virus

## 2020-10-19 NOTE — ED NOTES
Emergency Department Nursing Plan of Care       The Nursing Plan of Care is developed from the Nursing assessment and Emergency Department Attending provider initial evaluation. The plan of care may be reviewed in the ED Provider note.     The Plan of Care was developed with the following considerations:   Patient / Family readiness to learn indicated by:verbalized understanding  Persons(s) to be included in education: patient  Barriers to Learning/Limitations:No    Signed     Beltran Wheeler RN    10/19/2020   3:40 PM

## 2020-10-19 NOTE — DISCHARGE INSTRUCTIONS
Patient Education        Upper Respiratory Infection (Cold): Care Instructions  Your Care Instructions     An upper respiratory infection, or URI, is an infection of the nose, sinuses, or throat. URIs are spread by coughs, sneezes, and direct contact. The common cold is the most frequent kind of URI. The flu and sinus infections are other kinds of URIs. Almost all URIs are caused by viruses. Antibiotics won't cure them. But you can treat most infections with home care. This may include drinking lots of fluids and taking over-the-counter pain medicine. You will probably feel better in 4 to 10 days. The doctor has checked you carefully, but problems can develop later. If you notice any problems or new symptoms, get medical treatment right away. Follow-up care is a key part of your treatment and safety. Be sure to make and go to all appointments, and call your doctor if you are having problems. It's also a good idea to know your test results and keep a list of the medicines you take. How can you care for yourself at home? · To prevent dehydration, drink plenty of fluids, enough so that your urine is light yellow or clear like water. Choose water and other caffeine-free clear liquids until you feel better. If you have kidney, heart, or liver disease and have to limit fluids, talk with your doctor before you increase the amount of fluids you drink. · Take an over-the-counter pain medicine, such as acetaminophen (Tylenol), ibuprofen (Advil, Motrin), or naproxen (Aleve). Read and follow all instructions on the label. · Before you use cough and cold medicines, check the label. These medicines may not be safe for young children or for people with certain health problems. · Be careful when taking over-the-counter cold or flu medicines and Tylenol at the same time. Many of these medicines have acetaminophen, which is Tylenol. Read the labels to make sure that you are not taking more than the recommended dose.  Too much acetaminophen (Tylenol) can be harmful. · Get plenty of rest.  · Do not smoke or allow others to smoke around you. If you need help quitting, talk to your doctor about stop-smoking programs and medicines. These can increase your chances of quitting for good. When should you call for help? Call 911 anytime you think you may need emergency care. For example, call if:    · You have severe trouble breathing. Call your doctor now or seek immediate medical care if:    · You seem to be getting much sicker.     · You have new or worse trouble breathing.     · You have a new or higher fever.     · You have a new rash. Watch closely for changes in your health, and be sure to contact your doctor if:    · You have a new symptom, such as a sore throat, an earache, or sinus pain.     · You cough more deeply or more often, especially if you notice more mucus or a change in the color of your mucus.     · You do not get better as expected. Where can you learn more? Go to http://www.gray.com/  Enter K520 in the search box to learn more about \"Upper Respiratory Infection (Cold): Care Instructions. \"  Current as of: February 24, 2020               Content Version: 12.6  © 9977-2115 [employer]. Care instructions adapted under license by [employer] (which disclaims liability or warranty for this information). If you have questions about a medical condition or this instruction, always ask your healthcare professional. [place] [address] any warranty or liability for your use of this information.

## 2020-10-19 NOTE — LETTER
[address] EMERGENCY DEPT 
[address] [place] [id number] 
[phone] Work/School Note Date: 10/19/2020 To Whom It May concern: 
 
Dawn Haq was seen and treated today in the emergency room by the following provider(s): 
Attending Provider: Mala Deshpande MD 
Nurse Practitioner: Mort Lombard, NP. Dawn Haq may return to work on 10/23/2020 pending negative COVID results. Sincerely, Bri Nugent NP

## 2020-10-20 ENCOUNTER — PATIENT OUTREACH (OUTPATIENT)
Dept: CASE MANAGEMENT | Age: 25
End: 2020-10-20

## 2020-10-20 LAB
COVID-19, XGCOVT: NOT DETECTED
HEALTH STATUS, XMCV2T: NORMAL
SOURCE, COVRS: NORMAL
SPECIMEN SOURCE, FCOV2M: NORMAL
SPECIMEN TYPE, XMCV1T: NORMAL

## 2020-10-20 NOTE — PROGRESS NOTES
Patient contacted regarding COVID-19 exposure. Discussed COVID-19 related testing which was pending at this time. Test results were pending. Patient informed of results, if available? result pending. Outreach made within 2 business days of discharge: Yes    Care Transition Nurse/ Nicki Rivera was not able to contact the patient by telephone to perform post discharge assessment.

## 2021-04-13 ENCOUNTER — APPOINTMENT (OUTPATIENT)
Dept: GENERAL RADIOLOGY | Age: 26
End: 2021-04-13
Attending: EMERGENCY MEDICINE
Payer: MEDICAID

## 2021-04-13 ENCOUNTER — HOSPITAL ENCOUNTER (EMERGENCY)
Age: 26
Discharge: HOME | End: 2021-04-14
Attending: EMERGENCY MEDICINE
Payer: MEDICAID

## 2021-04-13 VITALS
OXYGEN SATURATION: 100 % | RESPIRATION RATE: 20 BRPM | TEMPERATURE: 98.3 F | HEART RATE: 80 BPM | HEIGHT: 73 IN | WEIGHT: 225 LBS | DIASTOLIC BLOOD PRESSURE: 60 MMHG | BODY MASS INDEX: 29.82 KG/M2 | SYSTOLIC BLOOD PRESSURE: 169 MMHG

## 2021-04-13 DIAGNOSIS — M25.562 ACUTE PAIN OF LEFT KNEE: ICD-10-CM

## 2021-04-13 DIAGNOSIS — S83.92XA SPRAIN OF LEFT KNEE, UNSPECIFIED LIGAMENT, INITIAL ENCOUNTER: Primary | ICD-10-CM

## 2021-04-13 PROCEDURE — 99283 EMERGENCY DEPT VISIT LOW MDM: CPT

## 2021-04-13 PROCEDURE — 73562 X-RAY EXAM OF KNEE 3: CPT

## 2021-04-14 PROCEDURE — 74011250637 HC RX REV CODE- 250/637: Performed by: EMERGENCY MEDICINE

## 2021-04-14 RX ORDER — IBUPROFEN 400 MG/1
800 TABLET ORAL ONCE
Status: COMPLETED | OUTPATIENT
Start: 2021-04-14 | End: 2021-04-14

## 2021-04-14 RX ORDER — ACETAMINOPHEN 500 MG
1000 TABLET ORAL ONCE
Status: COMPLETED | OUTPATIENT
Start: 2021-04-14 | End: 2021-04-14

## 2021-04-14 RX ADMIN — ACETAMINOPHEN 1000 MG: 500 TABLET, FILM COATED ORAL at 00:09

## 2021-04-14 RX ADMIN — IBUPROFEN 800 MG: 400 TABLET, FILM COATED ORAL at 00:09

## 2021-04-14 NOTE — ED NOTES
Patient c/o left knee pain. Patient states he was stretching and hyperextended knee. No obvious deformity noted. Emergency Department Nursing Plan of Care       The Nursing Plan of Care is developed from the Nursing assessment and Emergency Department Attending provider initial evaluation. The plan of care may be reviewed in the ED Provider note.     The Plan of Care was developed with the following considerations:   Patient / Family readiness to learn indicated by:verbalized understanding  Persons(s) to be included in education: patient  Barriers to Learning/Limitations:No    Signed     Maranda Root [facility]    4/14/2021   12:04 AM

## 2021-04-14 NOTE — ED PROVIDER NOTES
EMERGENCY DEPARTMENT HISTORY AND PHYSICAL EXAM      Date: 4/13/2021  Patient Name: Fabi Owen    History of Presenting Illness     Chief Complaint   Patient presents with    Knee Pain       History Provided By: Patient    HPI: Fabi Client, 26 y.o. male  Without significant past medical history presenting today with left knee pain. The patient says that he was stretching doing a quadriceps stretch when he felt a pop and had pain in the knee. He notes that the pain is in the anterior part of the knee and in the inferior portion. He has had difficulty walking due to the pain. He is not take any medications or used ice no other complaints at this time. There are no other complaints, changes, or physical findings at this time. PCP: None    No current facility-administered medications on file prior to encounter. Current Outpatient Medications on File Prior to Encounter   Medication Sig Dispense Refill    guaiFENesin ER (MUCINEX) 600 mg ER tablet Take 1 Tab by mouth two (2) times a day. 14 Tab 0    fluticasone propionate (Flonase) 50 mcg/actuation nasal spray 2 Sprays by Both Nostrils route daily. 1 Bottle 0       Past History     Past Medical History:  History reviewed. No pertinent past medical history. Past Surgical History:  No past surgical history on file. Family History:  History reviewed. No pertinent family history. Social History:  Social History     Tobacco Use    Smoking status: Never Smoker    Smokeless tobacco: Never Used   Substance Use Topics    Alcohol use: No    Drug use: No     Comment: last use 2/1/2015       Allergies:   Allergies   Allergen Reactions    Shellfish Derived Hives         Review of Systems   Constitutional: No  fever  Skin: No  rash  HEENT: No  nasal congestion  Resp: No cough  CV: No chest pain  GI: No vomiting  : No dysuria  MSK: + joint pain  Neuro: No numbness  Psych: No anxiety      Physical Exam     Patient Vitals for the past 12 hrs:   Temp Pulse Resp BP SpO2   04/13/21 2323 98.3 °F (36.8 °C) 80 20 (!) 169/60 100 %       General: alert, No acute distress  Eyes: EOMI, normal conjunctiva  ENT: moist mucous membranes. Neck: Active, full ROM of neck. Skin: No rashes. no jaundice              Lungs: Equal chest expansion. no respiratory distress. Heart: regular rate     no peripheral edema    Abd:  non distended soft  Back: Full ROM  MSK: Tenderness in the anterior part of the knee, no obvious effusion, intact range of motion, mildly limited due to pain  Neuro: alert  Person, Place, Time and Situation; normal speech;   Psych: Cooperative with exam; Appropriate mood and affect             Diagnostic Study Results     Labs -   No results found for this or any previous visit (from the past 12 hour(s)). Radiologic Studies -   XR KNEE LT 3 V   Final Result   No acute abnormality. CT Results  (Last 48 hours)    None        CXR Results  (Last 48 hours)    None          Medical Decision Making   I am the first provider for this patient. I reviewed the vital signs, available nursing notes, past medical history, past surgical history, family history and social history. Vital Signs-Reviewed the patient's vital signs. Patient Vitals for the past 12 hrs:   Temp Pulse Resp BP SpO2   04/13/21 2323 98.3 °F (36.8 °C) 80 20 (!) 169/60 100 %     Provider Notes (Medical Decision Making):     Differential Diagnosis: Sprain, fracture, dislocation, patellar tendon rupture    Initial Plan: Will treat with oral analgesics and obtain x-ray    ED Course:   Initial assessment performed. The patients presenting problems have been discussed, and they are in agreement with the care plan formulated and outlined with them. I have encouraged them to ask questions as they arise throughout their visit. ED Course as of Apr 14 0150   Wed Apr 14, 2021   0052 My interpretation of the patient's knee film no evidence of fracture.     [NW]      ED Course User Index  [NW] Gustavo Jason MD       Patient presenting today with knee pain, no evidence of fracture on x-ray, given knee immobilizer and crutches and patient is discharged. Conrad Marcano MD, am the attending of record for this patient encounter. Dispo: Discharged. The patient has been re-evaluated and is ready for discharge. Reviewed available results with patient. Counseled patient on diagnosis and care plan. Patient has expressed understanding, and all questions have been answered. Patient agrees with plan and agrees to follow up as recommended, or to return to the ED if their symptoms worsen. Discharge instructions have been provided and explained to the patient, along with reasons to return to the ED. PLAN:  Discharge Medication List as of 4/14/2021 12:52 AM        2. Follow-up Information     Follow up With Specialties Details Why Contact Info    [facility]  Schedule an appointment as soon as possible for a visit   [address] [place] [address]  [address] [place] [address],[address]  [address] [facility]  [phone]        3. Return to ED if worse       Diagnosis     Clinical Impression:   1. Sprain of left knee, unspecified ligament, initial encounter    2. Acute pain of left knee        Attestations:    Chavez Cohen MD    Please note that this dictation was completed with [employer], the computer voice recognition software. Quite often unanticipated grammatical, syntax, homophones, and other interpretive errors are inadvertently transcribed by the computer software. Please disregard these errors. Please excuse any errors that have escaped final proofreading. Thank you.

## 2021-10-02 ENCOUNTER — HOSPITAL ENCOUNTER (EMERGENCY)
Age: 26
Discharge: HOME | End: 2021-10-02
Attending: EMERGENCY MEDICINE
Payer: MEDICAID

## 2021-10-02 ENCOUNTER — APPOINTMENT (OUTPATIENT)
Dept: GENERAL RADIOLOGY | Age: 26
End: 2021-10-02
Attending: PHYSICIAN ASSISTANT
Payer: MEDICAID

## 2021-10-02 VITALS
OXYGEN SATURATION: 98 % | WEIGHT: 210 LBS | BODY MASS INDEX: 27.83 KG/M2 | TEMPERATURE: 98.3 F | HEIGHT: 73 IN | HEART RATE: 67 BPM | DIASTOLIC BLOOD PRESSURE: 88 MMHG | SYSTOLIC BLOOD PRESSURE: 129 MMHG | RESPIRATION RATE: 16 BRPM

## 2021-10-02 DIAGNOSIS — S80.02XA CONTUSION OF LEFT KNEE, INITIAL ENCOUNTER: Primary | ICD-10-CM

## 2021-10-02 PROCEDURE — 74011250637 HC RX REV CODE- 250/637: Performed by: PHYSICIAN ASSISTANT

## 2021-10-02 PROCEDURE — 73562 X-RAY EXAM OF KNEE 3: CPT

## 2021-10-02 PROCEDURE — 99283 EMERGENCY DEPT VISIT LOW MDM: CPT

## 2021-10-02 RX ORDER — IBUPROFEN 800 MG/1
800 TABLET ORAL
Qty: 20 TABLET | Refills: 0 | Status: SHIPPED | OUTPATIENT
Start: 2021-10-02 | End: 2021-10-09

## 2021-10-02 RX ORDER — IBUPROFEN 400 MG/1
800 TABLET ORAL
Status: COMPLETED | OUTPATIENT
Start: 2021-10-02 | End: 2021-10-02

## 2021-10-02 RX ADMIN — IBUPROFEN 800 MG: 400 TABLET ORAL at 14:01

## 2021-10-02 NOTE — ED NOTES
Emergency Department Nursing Plan of Care       The Nursing Plan of Care is developed from the Nursing assessment and Emergency Department Attending provider initial evaluation. The plan of care may be reviewed in the ED Provider note.     The Plan of Care was developed with the following considerations:   Patient / Family readiness to learn indicated by:verbalized understanding  Persons(s) to be included in education: patient  Barriers to Learning/Limitations:No    Signed     Ghazala Leung RN    10/2/2021   1:35 PM
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
## 2022-08-12 ENCOUNTER — HOSPITAL ENCOUNTER (EMERGENCY)
Age: 27
Discharge: HOME | End: 2022-08-12
Attending: EMERGENCY MEDICINE
Payer: MEDICAID

## 2022-08-12 VITALS
SYSTOLIC BLOOD PRESSURE: 131 MMHG | OXYGEN SATURATION: 100 % | TEMPERATURE: 98 F | HEART RATE: 83 BPM | HEIGHT: 73 IN | DIASTOLIC BLOOD PRESSURE: 69 MMHG | RESPIRATION RATE: 16 BRPM | WEIGHT: 201 LBS | BODY MASS INDEX: 26.64 KG/M2

## 2022-08-12 DIAGNOSIS — J01.01 ACUTE RECURRENT MAXILLARY SINUSITIS: Primary | ICD-10-CM

## 2022-08-12 DIAGNOSIS — Z20.822 PERSON UNDER INVESTIGATION FOR COVID-19: ICD-10-CM

## 2022-08-12 LAB
SARS-COV-2, XPLCVT: NOT DETECTED
SOURCE, COVRS: NORMAL

## 2022-08-12 PROCEDURE — 99283 EMERGENCY DEPT VISIT LOW MDM: CPT

## 2022-08-12 PROCEDURE — U0005 INFEC AGEN DETEC AMPLI PROBE: HCPCS

## 2022-08-12 RX ORDER — AMOXICILLIN AND CLAVULANATE POTASSIUM 875; 125 MG/1; MG/1
1 TABLET, FILM COATED ORAL 2 TIMES DAILY
Qty: 20 TABLET | Refills: 0 | Status: SHIPPED | OUTPATIENT
Start: 2022-08-12

## 2022-08-12 RX ORDER — CETIRIZINE HCL 10 MG
10 TABLET ORAL DAILY
Qty: 30 TABLET | Refills: 0 | Status: SHIPPED | OUTPATIENT
Start: 2022-08-12 | End: 2022-09-11

## 2022-08-12 RX ORDER — GUAIFENESIN 400 MG/1
400 TABLET ORAL
Qty: 20 TABLET | Refills: 0 | Status: SHIPPED | OUTPATIENT
Start: 2022-08-12

## 2022-08-12 RX ORDER — FLUTICASONE PROPIONATE 50 MCG
2 SPRAY, SUSPENSION (ML) NASAL DAILY
Qty: 1 EACH | Refills: 0 | Status: SHIPPED | OUTPATIENT
Start: 2022-08-12

## 2022-08-12 RX ORDER — IBUPROFEN 600 MG/1
600 TABLET ORAL
Qty: 20 TABLET | Refills: 0 | Status: SHIPPED | OUTPATIENT
Start: 2022-08-12

## 2022-08-12 NOTE — Clinical Note
[facility] EMERGENCY DEPT  [address] [facility] [address]  [phone]    Work/School Note    Date: 8/12/2022     To Whom It May concern:    Theresa Anne was evaluated by the following provider(s):  Attending Provider: Tasha Caban MD  Nurse Practitioner: Sandi Flores NP.   COVID19 virus is suspected. Per the CDC guidelines we recommend home isolation until the following conditions are all met:    1. At least five days have passed since symptoms first appeared and/or had a close exposure,   2. After home isolation for five days, wearing a mask around others for the next five days,  3. At least 24 have passed since last fever without the use of fever-reducing medications and  4.  Symptoms (eg cough, shortness of breath) have improved      Sincerely,          Rogerio Malone NP

## 2022-08-12 NOTE — Clinical Note
[facility] EMERGENCY DEPT  [address] [facility] [id number] [phone]    Work/School Note    Date: 8/12/2022    To Whom It May concern:    Damian Steiner was seen and treated today in the emergency room by the following provider(s):  Attending Provider: Ion Andrew MD  Nurse Practitioner: Collins Whitehead NP. [facility] [employer] [facility] is excused from work/school on 8/12/2022 through 8/14/2022. He is medically clear to return to work/school on 8/15/2022.          Sincerely,          Ceasar Thompson NP

## 2022-08-12 NOTE — ED PROVIDER NOTES
EMERGENCY DEPARTMENT HISTORY AND PHYSICAL EXAM          Date: 8/12/2022  Patient Name: Juan Luis De León    History of Presenting Illness     Chief Complaint   Patient presents with    Epistaxis     And CONGESTION since June          History Provided By: Patient    Chief Complaint: nasal congestion  Duration: onset 2 months ago   Timing:  Gradual and Worsening  Location: nasal congestion  Quality:  thick mucous with blood  Severity: Moderate  Modifying Factors: none  Associated Symptoms:  ear congestion      HPI: Juan Luis De León is a 33 y.o. male with a PMH of No significant past medical history who presents with nasal congestion which she has had since June. Patient states that this morning he blew his nose and there was blood on the tissue. Patient states he had no further bleeding. Patient states he feels congested and his ears feel congested. He denies fever. He denies history of seasonal allergies. He endorses postnasal drip. He denies shortness of breath or wheezing. He reports he only coughs when he has the postnasal drip. PCP: None    Current Outpatient Medications   Medication Sig Dispense Refill    amoxicillin-clavulanate (Augmentin) 875-125 mg per tablet Take 1 Tablet by mouth two (2) times a day. 20 Tablet 0    cetirizine (ZyrTEC) 10 mg tablet Take 1 Tablet by mouth in the morning for 30 days. 30 Tablet 0    guaiFENesin (ORGANIDIN) 400 mg tablet Take 1 Tablet by mouth every six (6) hours as needed for Congestion. 20 Tablet 0    ibuprofen (MOTRIN) 600 mg tablet Take 1 Tablet by mouth every six (6) hours as needed for Pain. 20 Tablet 0       Past History     Past Medical History:  No past medical history on file. Past Surgical History:  No past surgical history on file. Family History:  No family history on file.     Social History:  Social History     Tobacco Use    Smoking status: Never    Smokeless tobacco: Never   Substance Use Topics    Alcohol use: No    Drug use: No     Comment: last use 2/1/2015       Allergies: Allergies   Allergen Reactions    Shellfish Derived Hives         Review of Systems   Review of Systems   Constitutional:  Negative for chills, fatigue and fever. HENT:  Positive for congestion and postnasal drip. Negative for facial swelling and sore throat. Eyes:  Negative for redness. Respiratory:  Negative for cough, chest tightness and wheezing. Cardiovascular:  Negative for chest pain. Gastrointestinal:  Negative for abdominal pain. Musculoskeletal:  Negative for neck pain and neck stiffness. Skin:  Negative for rash. Neurological:  Negative for dizziness, syncope, weakness, light-headedness, numbness and headaches. Hematological:  Negative for adenopathy. All other systems reviewed and are negative. Physical Exam     Vitals:    08/12/22 1308   BP: 131/69   Pulse: 83   Resp: 16   Temp: 98 °F (36.7 °C)   SpO2: 100%   Weight: 91.2 kg (201 lb)   Height: 6' 1\" (1.854 m)     Physical Exam  Vitals and nursing note reviewed. Constitutional:       Appearance: Normal appearance. He is well-developed. HENT:      Head: Normocephalic and atraumatic. Right Ear: External ear normal.      Left Ear: External ear normal. A middle ear effusion is present. Nose: Congestion present. Comments: Maxillary sinus tenderness     Mouth/Throat:      Mouth: Mucous membranes are moist.   Eyes:      General:         Right eye: No discharge. Left eye: No discharge. Conjunctiva/sclera: Conjunctivae normal.   Cardiovascular:      Rate and Rhythm: Normal rate and regular rhythm. Heart sounds: Normal heart sounds. Pulmonary:      Effort: Pulmonary effort is normal. No respiratory distress. Breath sounds: Normal breath sounds. No wheezing. Abdominal:      General: Bowel sounds are normal.      Palpations: Abdomen is soft. Tenderness: There is no abdominal tenderness. Musculoskeletal:         General: Normal range of motion.       Cervical back: Normal range of motion and neck supple. Lymphadenopathy:      Cervical: No cervical adenopathy. Skin:     General: Skin is warm and dry. Neurological:      Mental Status: He is alert and oriented to person, place, and time. Cranial Nerves: No cranial nerve deficit. Psychiatric:         Behavior: Behavior normal.         Thought Content: Thought content normal.         Judgment: Judgment normal.         Diagnostic Study Results     Labs -   No results found for this or any previous visit (from the past 12 hour(s)). Radiologic Studies -   No orders to display     CT Results  (Last 48 hours)      None          CXR Results  (Last 48 hours)      None              Medical Decision Making   I am the first provider for this patient. I reviewed the vital signs, available nursing notes, past medical history, past surgical history, family history and social history. Vital Signs-Reviewed the patient's vital signs. Records Reviewed: Nursing Notes    Provider Notes (Medical Decision Making):   DDX sinusitis allergic rhinitis acute otitis media middle ear effusion seasonal allergies nasal polyps          Disposition:  home    DISCHARGE NOTE:   3:47 PM  I have discussed with patient their diagnosis, treatment, and follow up plan. The patient agrees to follow up as outlined in discharge paperwork and also to return to the ED with any worsening.  David Calderon NP       Follow-up Information       Follow up With Specialties Details Why [address] Internal Medicine In 1 week  [facility]  [address] [facility] [address]    Sonam Garcia MD Otolaryngology In 1 week  [address] [facility]  [address] [place] [address].  [phone]              Discharge Medication List as of 8/12/2022  2:06 PM        START taking these medications    Details   amoxicillin-clavulanate (Augmentin) 875-125 mg per tablet Take 1 Tablet by mouth two (2) times a day., Normal, Disp-20 Tablet, R-0      cetirizine (ZyrTEC) 10 mg tablet Take 1 Tablet by mouth in the morning for 30 days. , Normal, Disp-30 Tablet, R-0      guaiFENesin (ORGANIDIN) 400 mg tablet Take 1 Tablet by mouth every six (6) hours as needed for Congestion. , Normal, Disp-20 Tablet, R-0      ibuprofen (MOTRIN) 600 mg tablet Take 1 Tablet by mouth every six (6) hours as needed for Pain., Normal, Disp-20 Tablet, R-0             Procedures:  Procedures    Please note that this dictation was completed with Dragon, computer voice recognition software. Quite often unanticipated grammatical, syntax, homophones, and other interpretive errors are inadvertently transcribed by the computer software. Please disregard these errors. Additionally, please excuse any errors that have escaped final proofreading. Diagnosis     Clinical Impression:   1. Acute recurrent maxillary sinusitis    2.  Person under investigation for COVID-19

## 2022-08-12 NOTE — Clinical Note
[facility] EMERGENCY DEPT  [address] [facility] [address] [phone]    Work/School Note    Date: 8/12/2022    To Whom It May concern:    Vanessa Karimi was seen and treated today in the emergency room by the following provider(s):  Attending Provider: Radha Connor MD  Nurse Practitioner: Carlee Spivey NP. Vanessa Karimi is excused from work/school on 8/12/2022 through 8/14/2022. He is medically clear to return to work/school on 8/15/2022.          Sincerely,          Tejas Sellers NP

## 2022-08-12 NOTE — Clinical Note
[facility] EMERGENCY DEPT  [address] [facility] [address] [phone]    Work/School Note    Date: 8/12/2022     To Whom It May concern:    Belle [place]by was evaluated by the following provider(s):  Attending Provider: Beth Vargas MD  Nurse Practitioner: Evaristo Tomlinson NP.   COVID19 virus is suspected. Per the CDC guidelines we recommend home isolation until the following conditions are all met:    1. At least five days have passed since symptoms first appeared and/or had a close exposure,   2. After home isolation for five days, wearing a mask around others for the next five days,  3. At least 24 have passed since last fever without the use of fever-reducing medications and  4.  Symptoms (eg cough, shortness of breath) have improved      Sincerely,          Alayna Kauffman NP

## 2023-03-20 ENCOUNTER — HOSPITAL ENCOUNTER (EMERGENCY)
Age: 28
Discharge: HOME | End: 2023-03-20
Attending: EMERGENCY MEDICINE
Payer: MEDICAID

## 2023-03-20 ENCOUNTER — APPOINTMENT (OUTPATIENT)
Dept: GENERAL RADIOLOGY | Age: 28
End: 2023-03-20
Payer: MEDICAID

## 2023-03-20 VITALS
TEMPERATURE: 98.2 F | HEART RATE: 56 BPM | BODY MASS INDEX: 27.83 KG/M2 | SYSTOLIC BLOOD PRESSURE: 136 MMHG | DIASTOLIC BLOOD PRESSURE: 80 MMHG | OXYGEN SATURATION: 100 % | RESPIRATION RATE: 18 BRPM | HEIGHT: 73 IN | WEIGHT: 210 LBS

## 2023-03-20 DIAGNOSIS — S93.401A SPRAIN OF RIGHT ANKLE, UNSPECIFIED LIGAMENT, INITIAL ENCOUNTER: Primary | ICD-10-CM

## 2023-03-20 PROCEDURE — 73610 X-RAY EXAM OF ANKLE: CPT

## 2023-03-20 PROCEDURE — 99283 EMERGENCY DEPT VISIT LOW MDM: CPT

## 2023-03-20 RX ORDER — NAPROXEN 500 MG/1
500 TABLET ORAL 2 TIMES DAILY WITH MEALS
Qty: 20 TABLET | Refills: 0 | Status: SHIPPED | OUTPATIENT
Start: 2023-03-20 | End: 2023-03-30

## 2023-03-20 NOTE — ED NOTES
Pt presents to ED with c/o right ankle swelling & pain that travels to the back of calf. Pt played in a football game on 3/18 & reports someone stepped on his \"achilles. \" Pt denies any other injuries or falls. Emergency Department Nursing Plan of Care       The Nursing Plan of Care is developed from the Nursing assessment and Emergency Department Attending provider initial evaluation. The plan of care may be reviewed in the ED Provider note.     The Plan of Care was developed with the following considerations:   Patient / Family readiness to learn indicated by:verbalized understanding  Persons(s) to be included in education: patient  Barriers to Learning/Limitations:No    Signed     Zeny Rosales RN    3/20/2023   5:23 PM
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
## 2023-04-14 ENCOUNTER — HOSPITAL ENCOUNTER (OUTPATIENT)
Dept: MRI IMAGING | Age: 28
Discharge: HOME | End: 2023-04-14
Attending: FAMILY MEDICINE
Payer: MEDICAID

## 2023-04-14 DIAGNOSIS — S86.011A ACHILLES RUPTURE, RIGHT: ICD-10-CM

## 2023-04-14 DIAGNOSIS — M25.571 RIGHT ANKLE PAIN: ICD-10-CM

## 2023-04-14 PROCEDURE — 73721 MRI JNT OF LWR EXTRE W/O DYE: CPT

## 2023-04-19 ENCOUNTER — ANESTHESIA EVENT (OUTPATIENT)
Dept: SURGERY | Age: 28
End: 2023-04-19
Payer: MEDICAID

## 2023-04-19 NOTE — PERIOP NOTES
[address] , [address] [facility]   PRE-ADMISSION TESTING    [phone]     Surgery Date:  Thursday May 20, 2023       Is surgery arrival time given by surgeon? YES  NO  If NO, [address] staff will call you between 3 and 7pm the day before your surgery with your arrival time. (If your surgery is on a Monday, we will call you the Friday before.)    Call [phone] after 7pm Monday-Friday if you did not receive this call. INSTRUCTIONS BEFORE YOUR SURGERY   When You  Arrive Arrive at the [address] [facility] on the day of your surgery  Have your insurance card, photo ID, and any copayment (if needed)   Food   and   Drink NO food or drink after midnight the night before surgery    This means NO water, gum, mints, coffee, juice, etc.  No alcohol (beer, wine, liquor) 24 hours before and after surgery. No marajuana today. Medications to   TAKE   Morning of Surgery MEDICATIONS TO TAKE THE MORNING OF SURGERY WITH A SIP OF WATER:     None   Medications  To  STOP      7 days before surgery Non-Steroidal anti-inflammatory Drugs (NSAID's): for example, Ibuprofen (Advil, Motrin), Naproxen (Aleve)  Aspirin, if taking for pain   Herbal supplements, vitamins, and fish oil  (Pain medications not listed above, including Tylenol may be taken)   Blood  Thinners Not applicable.    Bathing Clothing  Jewelry  Valuables     If you shower the morning of surgery, please do not apply anything to your skin (lotions, powders, deodorant)  Do not shave or trim anywhere 24 hours before surgery  Wear loose, comfortable, clean clothes  Leave money, valuables, and jewelry, including body piercings, at home       [address] [place] [address] - or Spending the Night SAME-DAY SURGERY: You must have a responsible adult drive you home and stay with you 24 hours after surgery  ADMITS: If your doctor is keeping you in the hospital after surgery, leave personal belongings/luggage in your car until you have a hospital room number. Hospital discharge time is 12 noon  Drivers must be here before 12 noon unless you are told differently   Special Instructions Free  parking is available at the main front entrance of the hospital Monday-Friday 7a-5p. Follow all instructions so your surgery wont be cancelled. Please, be on time. If a situation occurs and you are delayed the day of surgery, call [phone] . If your physical condition changes (like a fever, cold, flu, etc.) call your surgeon. Home medication(s) reviewed and verified via   PHONE   during PAT appointment. The patient was contacted by  PHONE      The patient verbalizes understanding of all instructions and    DOES NOT   need reinforcement.

## 2023-04-20 ENCOUNTER — ANESTHESIA (OUTPATIENT)
Dept: SURGERY | Age: 28
End: 2023-04-20
Payer: MEDICAID

## 2023-04-20 ENCOUNTER — HOSPITAL ENCOUNTER (OUTPATIENT)
Age: 28
Setting detail: OUTPATIENT SURGERY
Discharge: HOME | End: 2023-04-20
Attending: ORTHOPAEDIC SURGERY | Admitting: ORTHOPAEDIC SURGERY
Payer: MEDICAID

## 2023-04-20 VITALS
TEMPERATURE: 97.5 F | WEIGHT: 201.5 LBS | BODY MASS INDEX: 26.71 KG/M2 | OXYGEN SATURATION: 96 % | RESPIRATION RATE: 14 BRPM | SYSTOLIC BLOOD PRESSURE: 161 MMHG | DIASTOLIC BLOOD PRESSURE: 100 MMHG | HEART RATE: 60 BPM | HEIGHT: 73 IN

## 2023-04-20 PROCEDURE — 77030026438 HC STYL ET INTUB CARD -A: Performed by: ANESTHESIOLOGY

## 2023-04-20 PROCEDURE — 77030031139 HC SUT VCRL2 J&J -A: Performed by: ORTHOPAEDIC SURGERY

## 2023-04-20 PROCEDURE — 2709999900 HC NON-CHARGEABLE SUPPLY: Performed by: ORTHOPAEDIC SURGERY

## 2023-04-20 PROCEDURE — 74011250637 HC RX REV CODE- 250/637: Performed by: ORTHOPAEDIC SURGERY

## 2023-04-20 PROCEDURE — 74011250636 HC RX REV CODE- 250/636: Performed by: ANESTHESIOLOGY

## 2023-04-20 PROCEDURE — 76210000006 HC OR PH I REC 0.5 TO 1 HR: Performed by: ORTHOPAEDIC SURGERY

## 2023-04-20 PROCEDURE — 77030002916 HC SUT ETHLN J&J -A: Performed by: ORTHOPAEDIC SURGERY

## 2023-04-20 PROCEDURE — 76060000034 HC ANESTHESIA 1.5 TO 2 HR: Performed by: ORTHOPAEDIC SURGERY

## 2023-04-20 PROCEDURE — 74011000250 HC RX REV CODE- 250: Performed by: NURSE ANESTHETIST, CERTIFIED REGISTERED

## 2023-04-20 PROCEDURE — 77030040223 HC IMPL SYS PARS SUT TAPE ARTH -H1: Performed by: ORTHOPAEDIC SURGERY

## 2023-04-20 PROCEDURE — 74011000250 HC RX REV CODE- 250: Performed by: ORTHOPAEDIC SURGERY

## 2023-04-20 PROCEDURE — 76210000020 HC REC RM PH II FIRST 0.5 HR: Performed by: ORTHOPAEDIC SURGERY

## 2023-04-20 PROCEDURE — 74011250636 HC RX REV CODE- 250/636: Performed by: NURSE ANESTHETIST, CERTIFIED REGISTERED

## 2023-04-20 PROCEDURE — 74011250636 HC RX REV CODE- 250/636: Performed by: ORTHOPAEDIC SURGERY

## 2023-04-20 PROCEDURE — 77030008684 HC TU ET CUF COVD -B: Performed by: ANESTHESIOLOGY

## 2023-04-20 PROCEDURE — 76010000153 HC OR TIME 1.5 TO 2 HR: Performed by: ORTHOPAEDIC SURGERY

## 2023-04-20 PROCEDURE — 77030002933 HC SUT MCRYL J&J -A: Performed by: ORTHOPAEDIC SURGERY

## 2023-04-20 PROCEDURE — 77030006773 HC BLD SAW OSC BRSM -A: Performed by: ORTHOPAEDIC SURGERY

## 2023-04-20 PROCEDURE — 77030013079 HC BLNKT BAIR HGGR 3M -A: Performed by: ANESTHESIOLOGY

## 2023-04-20 DEVICE — PARS SUTURE IMPLANT KIT W/ SUTURETAPE
Type: IMPLANTABLE DEVICE | Site: ACHILLES TENDON | Status: FUNCTIONAL
Brand: ARTHREX®

## 2023-04-20 RX ORDER — LIDOCAINE HYDROCHLORIDE 10 MG/ML
0.1 INJECTION, SOLUTION EPIDURAL; INFILTRATION; INTRACAUDAL; PERINEURAL AS NEEDED
Status: DISCONTINUED | OUTPATIENT
Start: 2023-04-20 | End: 2023-04-20 | Stop reason: HOSPADM

## 2023-04-20 RX ORDER — PROPOFOL 10 MG/ML
INJECTION, EMULSION INTRAVENOUS AS NEEDED
Status: DISCONTINUED | OUTPATIENT
Start: 2023-04-20 | End: 2023-04-20 | Stop reason: HOSPADM

## 2023-04-20 RX ORDER — GABAPENTIN 300 MG/1
300 CAPSULE ORAL ONCE
Status: COMPLETED | OUTPATIENT
Start: 2023-04-20 | End: 2023-04-20

## 2023-04-20 RX ORDER — SODIUM CHLORIDE 0.9 % (FLUSH) 0.9 %
5-40 SYRINGE (ML) INJECTION AS NEEDED
Status: DISCONTINUED | OUTPATIENT
Start: 2023-04-20 | End: 2023-04-20 | Stop reason: HOSPADM

## 2023-04-20 RX ORDER — NALOXONE HYDROCHLORIDE 0.4 MG/ML
0.04 INJECTION, SOLUTION INTRAMUSCULAR; INTRAVENOUS; SUBCUTANEOUS
Status: DISCONTINUED | OUTPATIENT
Start: 2023-04-20 | End: 2023-04-20 | Stop reason: HOSPADM

## 2023-04-20 RX ORDER — SODIUM CHLORIDE 0.9 % (FLUSH) 0.9 %
5-40 SYRINGE (ML) INJECTION EVERY 8 HOURS
Status: DISCONTINUED | OUTPATIENT
Start: 2023-04-20 | End: 2023-04-20 | Stop reason: HOSPADM

## 2023-04-20 RX ORDER — DIPHENHYDRAMINE HYDROCHLORIDE 50 MG/ML
12.5 INJECTION, SOLUTION INTRAMUSCULAR; INTRAVENOUS AS NEEDED
Status: DISCONTINUED | OUTPATIENT
Start: 2023-04-20 | End: 2023-04-20 | Stop reason: HOSPADM

## 2023-04-20 RX ORDER — ROCURONIUM BROMIDE 10 MG/ML
INJECTION, SOLUTION INTRAVENOUS AS NEEDED
Status: DISCONTINUED | OUTPATIENT
Start: 2023-04-20 | End: 2023-04-20 | Stop reason: HOSPADM

## 2023-04-20 RX ORDER — FENTANYL CITRATE 50 UG/ML
INJECTION, SOLUTION INTRAMUSCULAR; INTRAVENOUS AS NEEDED
Status: DISCONTINUED | OUTPATIENT
Start: 2023-04-20 | End: 2023-04-20 | Stop reason: HOSPADM

## 2023-04-20 RX ORDER — ONDANSETRON 2 MG/ML
INJECTION INTRAMUSCULAR; INTRAVENOUS AS NEEDED
Status: DISCONTINUED | OUTPATIENT
Start: 2023-04-20 | End: 2023-04-20 | Stop reason: HOSPADM

## 2023-04-20 RX ORDER — HYDROMORPHONE HYDROCHLORIDE 1 MG/ML
.25-1 INJECTION, SOLUTION INTRAMUSCULAR; INTRAVENOUS; SUBCUTANEOUS
Status: DISCONTINUED | OUTPATIENT
Start: 2023-04-20 | End: 2023-04-20 | Stop reason: HOSPADM

## 2023-04-20 RX ORDER — ONDANSETRON 2 MG/ML
4 INJECTION INTRAMUSCULAR; INTRAVENOUS AS NEEDED
Status: DISCONTINUED | OUTPATIENT
Start: 2023-04-20 | End: 2023-04-20 | Stop reason: HOSPADM

## 2023-04-20 RX ORDER — KETOROLAC TROMETHAMINE 30 MG/ML
INJECTION, SOLUTION INTRAMUSCULAR; INTRAVENOUS AS NEEDED
Status: DISCONTINUED | OUTPATIENT
Start: 2023-04-20 | End: 2023-04-20 | Stop reason: HOSPADM

## 2023-04-20 RX ORDER — SODIUM CHLORIDE, SODIUM LACTATE, POTASSIUM CHLORIDE, CALCIUM CHLORIDE 600; 310; 30; 20 MG/100ML; MG/100ML; MG/100ML; MG/100ML
INJECTION, SOLUTION INTRAVENOUS
Status: DISCONTINUED | OUTPATIENT
Start: 2023-04-20 | End: 2023-04-20 | Stop reason: HOSPADM

## 2023-04-20 RX ORDER — FENTANYL CITRATE 50 UG/ML
25 INJECTION, SOLUTION INTRAMUSCULAR; INTRAVENOUS
Status: DISCONTINUED | OUTPATIENT
Start: 2023-04-20 | End: 2023-04-20 | Stop reason: HOSPADM

## 2023-04-20 RX ORDER — DEXAMETHASONE SODIUM PHOSPHATE 4 MG/ML
INJECTION, SOLUTION INTRA-ARTICULAR; INTRALESIONAL; INTRAMUSCULAR; INTRAVENOUS; SOFT TISSUE AS NEEDED
Status: DISCONTINUED | OUTPATIENT
Start: 2023-04-20 | End: 2023-04-20 | Stop reason: HOSPADM

## 2023-04-20 RX ORDER — SODIUM CHLORIDE, SODIUM LACTATE, POTASSIUM CHLORIDE, CALCIUM CHLORIDE 600; 310; 30; 20 MG/100ML; MG/100ML; MG/100ML; MG/100ML
125 INJECTION, SOLUTION INTRAVENOUS CONTINUOUS
Status: DISCONTINUED | OUTPATIENT
Start: 2023-04-20 | End: 2023-04-20 | Stop reason: HOSPADM

## 2023-04-20 RX ORDER — ACETAMINOPHEN 325 MG/1
650 TABLET ORAL ONCE
Status: COMPLETED | OUTPATIENT
Start: 2023-04-20 | End: 2023-04-20

## 2023-04-20 RX ORDER — FLUMAZENIL 0.1 MG/ML
0.2 INJECTION INTRAVENOUS
Status: DISCONTINUED | OUTPATIENT
Start: 2023-04-20 | End: 2023-04-20 | Stop reason: HOSPADM

## 2023-04-20 RX ORDER — SUCCINYLCHOLINE CHLORIDE 20 MG/ML
INJECTION INTRAMUSCULAR; INTRAVENOUS AS NEEDED
Status: DISCONTINUED | OUTPATIENT
Start: 2023-04-20 | End: 2023-04-20 | Stop reason: HOSPADM

## 2023-04-20 RX ORDER — HYDROMORPHONE HYDROCHLORIDE 2 MG/ML
INJECTION, SOLUTION INTRAMUSCULAR; INTRAVENOUS; SUBCUTANEOUS AS NEEDED
Status: DISCONTINUED | OUTPATIENT
Start: 2023-04-20 | End: 2023-04-20 | Stop reason: HOSPADM

## 2023-04-20 RX ORDER — MIDAZOLAM HYDROCHLORIDE 1 MG/ML
INJECTION, SOLUTION INTRAMUSCULAR; INTRAVENOUS AS NEEDED
Status: DISCONTINUED | OUTPATIENT
Start: 2023-04-20 | End: 2023-04-20 | Stop reason: HOSPADM

## 2023-04-20 RX ORDER — ALBUTEROL SULFATE 0.83 MG/ML
2.5 SOLUTION RESPIRATORY (INHALATION) AS NEEDED
Status: DISCONTINUED | OUTPATIENT
Start: 2023-04-20 | End: 2023-04-20 | Stop reason: HOSPADM

## 2023-04-20 RX ADMIN — DEXAMETHASONE SODIUM PHOSPHATE 8 MG: 4 INJECTION, SOLUTION INTRAMUSCULAR; INTRAVENOUS at 12:00

## 2023-04-20 RX ADMIN — PROPOFOL 200 MG: 10 INJECTION, EMULSION INTRAVENOUS at 13:01

## 2023-04-20 RX ADMIN — PROPOFOL 200 MG: 10 INJECTION, EMULSION INTRAVENOUS at 12:23

## 2023-04-20 RX ADMIN — HYDROMORPHONE HYDROCHLORIDE 0.5 MG: 2 INJECTION, SOLUTION INTRAMUSCULAR; INTRAVENOUS; SUBCUTANEOUS at 12:00

## 2023-04-20 RX ADMIN — MIDAZOLAM HYDROCHLORIDE 2 MG: 1 INJECTION, SOLUTION INTRAMUSCULAR; INTRAVENOUS at 11:37

## 2023-04-20 RX ADMIN — ACETAMINOPHEN 650 MG: 325 TABLET ORAL at 10:42

## 2023-04-20 RX ADMIN — SODIUM CHLORIDE, POTASSIUM CHLORIDE, SODIUM LACTATE AND CALCIUM CHLORIDE: 600; 310; 30; 20 INJECTION, SOLUTION INTRAVENOUS at 11:32

## 2023-04-20 RX ADMIN — HYDROMORPHONE HYDROCHLORIDE 0.5 MG: 2 INJECTION, SOLUTION INTRAMUSCULAR; INTRAVENOUS; SUBCUTANEOUS at 12:23

## 2023-04-20 RX ADMIN — SODIUM CHLORIDE, POTASSIUM CHLORIDE, SODIUM LACTATE AND CALCIUM CHLORIDE 125 ML/HR: 600; 310; 30; 20 INJECTION, SOLUTION INTRAVENOUS at 10:34

## 2023-04-20 RX ADMIN — SODIUM CHLORIDE, POTASSIUM CHLORIDE, SODIUM LACTATE AND CALCIUM CHLORIDE: 600; 310; 30; 20 INJECTION, SOLUTION INTRAVENOUS at 12:50

## 2023-04-20 RX ADMIN — CEFAZOLIN SODIUM 2 G: 1 POWDER, FOR SOLUTION INTRAMUSCULAR; INTRAVENOUS at 11:40

## 2023-04-20 RX ADMIN — HYDROMORPHONE HYDROCHLORIDE 0.5 MG: 1 INJECTION, SOLUTION INTRAMUSCULAR; INTRAVENOUS; SUBCUTANEOUS at 14:37

## 2023-04-20 RX ADMIN — GABAPENTIN 300 MG: 300 CAPSULE ORAL at 10:42

## 2023-04-20 RX ADMIN — ROCURONIUM BROMIDE 10 MG: 10 INJECTION INTRAVENOUS at 11:45

## 2023-04-20 RX ADMIN — KETOROLAC TROMETHAMINE 30 MG: 30 INJECTION, SOLUTION INTRAMUSCULAR; INTRAVENOUS at 13:05

## 2023-04-20 RX ADMIN — PROPOFOL 200 MG: 10 INJECTION, EMULSION INTRAVENOUS at 11:45

## 2023-04-20 RX ADMIN — FENTANYL CITRATE 100 MCG: 50 INJECTION, SOLUTION INTRAMUSCULAR; INTRAVENOUS at 11:45

## 2023-04-20 RX ADMIN — SUCCINYLCHOLINE CHLORIDE 100 MG: 20 INJECTION, SOLUTION INTRAMUSCULAR; INTRAVENOUS at 11:45

## 2023-04-20 RX ADMIN — ONDANSETRON HYDROCHLORIDE 4 MG: 2 SOLUTION INTRAMUSCULAR; INTRAVENOUS at 13:05

## 2023-04-20 NOTE — BRIEF OP NOTE
Brief Postoperative Note    Patient: Catalina Jolley  YOB: 1995  MRN: [id number]    Date of Procedure: 4/20/2023     Pre-Op Diagnosis:   RIGHT ACHILLES TENDON RUPTURE    Post-Op Diagnosis: Same as preoperative diagnosis. Procedure(s):  RIGHT ACHILLES REPAIR     Surgeon(s): Dia Clinton MD    Surgical Assistant: During the procedure Abram Stallworth PA-C performed the duties of positioning, retraction, assistance with limb and suture management as well as closure and dressing and splint placement      Anesthesia: General w/ local    Estimated Blood Loss (mL): less than 5cc     Complications: None    Specimens: * No specimens in log *     Implants:   Implant Name Type Inv. Item Serial No.  Lot No. LRB No. Used Action   SYSTEM IMPL INCL NDL 1.3MM WHT WHT/BLUE WHT/BLACK - SNA  SYSTEM IMPL INCL NDL 1.3MM WHT WHT/BLUE WHT/BLACK NA [id number] Right 1 Implanted       [employer]    Drains: * No LDAs found *    Findings: midsubstance Achilles tendon rupture    TT: thigh 300 mmHg x 55 min    Electronically Signed by Angel Colin MD on 4/20/2023 at 9:59 AM

## 2023-04-20 NOTE — H&P
Date of Surgery Update:  Dasia Cruz was seen and examined. History and physical has been reviewed. The patient has been examined. There have been no significant clinical changes since the completion of the originally dated History and Physical.  Patient identified by surgeon; surgical site was confirmed by patient and surgeon. Full paper H&P on chart    Signed By: Mala Metz.  MD Dixie     April 20, 2023 9:59 AM

## 2023-04-20 NOTE — DISCHARGE INSTRUCTIONS
DISCHARGE SUMMARY from your Nurse      PATIENT INSTRUCTIONS    After general anesthesia or intravenous sedation, for 24 hours or while taking prescription Narcotics:  Limit your activities  Do not drive and operate hazardous machinery  Do not make important personal or business decisions  Do  not drink alcoholic beverages  If you have not urinated within 8 hours after discharge, please contact your surgeon on call. Report the following to your surgeon:  Excessive pain, swelling, redness or odor of or around the surgical area  Temperature over 100.5  Nausea and vomiting lasting longer than 4 hours or if unable to take medications  Any signs of decreased circulation or nerve impairment to extremity: change in color, persistent  numbness, tingling, coldness or increase pain  Any questions      GOOD HELP TO FIGHT AN INFECTION  Here are a few tip to help reduce the chance of getting an infection after surgery:  Wash Your Hands  Good handwashing is the most important thing you and your caregiver can do. Wash before and after caring for any wounds. Dry your hand with a clean towel. Wash with soap and water for at least 20 seconds. A TIP: sing the \"Happy Birthday\" song through one time while washing to help with the timing. Use a hand  in between washings. Shower  When your surgeon says it is OK to take a shower, use a new bar of antibacterial soap (if that is what you use, and keep that bar of soap ONLY for your use), or antibacterial body wash. Use a clean wash cloth or sponge when you bathe. Dry off with a clean towel  after every bath - be careful around any wounds, skin staples, sutures or surgical glue over/on wounds. Do not enter swimming pools, hot tubs, lakes, rivers and/or ocean until wounds are healed and your doctor/surgeon says it is OK. Use Clean Sheets  Sleep on freshly laundered sheets after your surgery.   Keep the surgery site covered with a clean, dry bandage (if instructed to do so).  If the bandage becomes soiled, reapply a new, dry, clean bandage. Do not allow pets to sleep with you while your wound is healing. Lifestyle Modification and Controlling Your Blood Sugar  Smoking slows wound healing. Stop smoking and limit exposure to second-hand smoke. High blood sugar slows wound healing. Eat a well-balanced diet to provide proper nutrition while healing  Monitor your blood sugar (if you are a diabetic) and take your medications as you are suppose to so you can control you blood sugar after surgery. COUGH AND DEEP BREATHE    Breathing deeply and coughing are very important exercises to do after surgery. Deep breathing and coughing open the little air tubes and air sacks in your lungs. You take deep breaths every day. You may not even notice - it is just something you do when you sigh or yawn. It is a natural exercise you do to keep these air passages open. After surgery, take deep breaths and cough, on purpose. DIRECTIONS:  Take 10 to 15 slow deep breaths every hour while awake. Breathe in deeply, and hold it for 2 seconds. Exhale slowly through puckered lips, like blowing up a balloon. After every 4th or 5th deep breath, hug your pillow to your chest or belly and give a hard, deep cough. Yes, it will probably hurt. But doing this exercise is a very important part of healing after surgery. Take your pain medicine to help you do this exercise without too much pain. Coughing and deep breathing help prevent bronchitis and pneumonia after surgery. If you had chest or belly surgery, use a pillow as a \"hug cami\" and hold it tightly to your chest or belly when you cough. ANKLE PUMPS    Ankle pumps increase the circulation of oxygenated blood to your lower extremities and decrease your risk for circulation problems such as blood clots.  They also stretch the muscles, tendons and ligaments in your foot and ankle, and prevent joint contracture in the ankle and foot, especially after surgeries on the legs. It is important to do ankle pump exercises regularly after surgery because immobility increases your risk for developing a blood clot. Your doctor may also have you take an Aspirin for the next few days as well. If your doctor did not ask you to take an Aspirin, consult with him before starting Aspirin therapy on your own. The exercise is quite simple. Slowly point your foot forward, feeling the muscles on the top of your lower leg stretch, and hold this position for 5 seconds. Next, pull your foot back toward you as far as possible, stretching the calf muscles, and hold that position for 5 seconds. Repeat with the other foot. Perform 10 repetitions every hour while awake for both ankles if possible (down and then up with the foot once is one repetition). You should feel gentle stretching of the muscles in your lower leg when doing this exercise. If you feel pain, or your range of motion is limited, don't push too hard. Only go the limit your joint and muscles will let you go. If you have increasing pain, progressively worsening leg warmth or swelling, STOP the exercise and call your doctor. MEDICATION AND   SIDE EFFECT GUIDE    The [facility] Insurance MEDICATION AND SIDE EFFECT GUIDE was provided to the PATIENT AND CARE PROVIDER. Information provided includes instruction about drug purpose and common side effects for the following medications: These are general instructions for a healthy lifestyle:    *   Please give a list of your current medications to your Primary Care Provider. *   Please update this list whenever your medications are discontinued, doses are changed, or new medications (including over-the-counter products) are added. *   Please carry medication information at all times in case of emergency situations.       About Smoking  No smoking / No tobacco products  Avoid exposure to second hand smoke     Surgeon General's Warning:  Quitting smoking now greatly reduces serious risk to your health. Obesity, smoking, and sedentary lifestyle greatly increases your risk for illness and disease. A healthy diet, regular physical exercise & weight monitoring are important for maintaining a healthy lifestyle. Congestive Heart Failure  You may be retaining fluid if you have a history of heart failure or if you experience any of the following symptoms:  Weight gain of 3 pounds or more overnight or 5 pounds in a week, increased swelling in your hands or feet or shortness of breath while lying flat in bed. Please call your doctor as soon as you notice any of these symptoms; do not wait until your next office visit. Recognize signs and symptoms of STROKE:  F -  Face looks uneven  A -  Arms unable to move or move evenly  S -  Speech slurred or non-existent  T -  Time-call 911 as soon as signs and symptoms begin-DO NOT go          back to bed or wait to see if you get better-TIME IS BRAIN. Warning Signs of HEART ATTACK   Call 911 if you have these symptoms:    Chest discomfort. Most heart attacks involve discomfort in the center of the chest that lasts more than a few minutes, or that goes away and comes back. It can feel like uncomfortable pressure, squeezing, fullness, or pain. Discomfort in other areas of the upper body. Symptoms can include pain or discomfort in one or both arms, the back, neck, jaw, or stomach. Shortness of breath with or without chest discomfort. Other signs may include breaking out in a cold sweat, nausea, or lightheadedness. Don't wait more than five minutes to call 911 - MINUTES MATTER! Fast action can save your life. Calling 911 is almost always the fastest way to get lifesaving treatment. Emergency Medical Services staff can begin treatment when they arrive -- up to an hour sooner than if someone gets to the hospital by car.        Learning About Coronavirus (COVID-19)  Coronavirus (COVID-19): Overview  What is coronavirus ([id number]-12)? The coronavirus disease (COVID-19) is caused by a virus. It is an illness that was first found in [place], [place], in December 2019. It has since spread worldwide. The virus can cause fever, cough, and trouble breathing. In severe cases, it can cause pneumonia and make it hard to breathe without help. It can cause death. Coronaviruses are a large group of viruses. They cause the common cold. They also cause more serious illnesses like Middle East respiratory syndrome (MERS) and severe acute respiratory syndrome (SARS). COVID-19 is caused by a novel coronavirus. That means it's a new type that has not been seen in people before. This virus spreads person-to-person through droplets from coughing and sneezing. It can also spread when you are close to someone who is infected. And it can spread when you touch something that has the virus on it, such as a doorknob or a tabletop. What can you do to protect yourself from coronavirus (COVID-19)? The best way to protect yourself from getting sick is to: Avoid areas where there is an outbreak. Avoid contact with people who may be infected. Wash your hands often with soap or alcohol-based hand sanitizers. Avoid crowds and try to stay at least 6 feet away from other people. Wash your hands often, especially after you cough or sneeze. Use soap and water, and scrub for at least 20 seconds. If soap and water aren't available, use an alcohol-based hand . Avoid touching your mouth, nose, and eyes. What can you do to avoid spreading the virus to others? To help avoid spreading the virus to others:  Cover your mouth with a tissue when you cough or sneeze. Then throw the tissue in the trash. Use a disinfectant to clean things that you touch often. Stay home if you are sick or have been exposed to the virus. Don't go to school, work, or public areas.  And don't use public transportation. If you are sick:  Leave your home only if you need to get medical care. But call the doctor's office first so they know you're coming. And wear a face mask, if you have one. If you have a face mask, wear it whenever you're around other people. It can help stop the spread of the virus when you cough or sneeze. Clean and disinfect your home every day. Use household  and disinfectant wipes or sprays. Take special care to clean things that you grab with your hands. These include doorknobs, remote controls, phones, and handles on your refrigerator and microwave. And don't forget countertops, tabletops, bathrooms, and computer keyboards. When to call for help  Call 911 anytime you think you may need emergency care. For example, call if:  You have severe trouble breathing. (You can't talk at all.)  You have constant chest pain or pressure. You are severely dizzy or lightheaded. You are confused or can't think clearly. Your face and lips have a blue color. You pass out (lose consciousness) or are very hard to wake up. Call your doctor now if you develop symptoms such as:  Shortness of breath. Fever. Cough. If you need to get care, call ahead to the doctor's office for instructions before you go. Make sure you wear a face mask, if you have one, to prevent exposing other people to the virus. Where can you get the latest information? The following health organizations are tracking and studying this virus. Their websites contain the most up-to-date information. Samantha Larson also learn what to do if you think you may have been exposed to the virus. U.S. Centers for Disease Control and Prevention (CDC): The CDC provides updated news about the disease and travel advice. The website also tells you how to prevent the spread of infection. www.cdc.gov  World Health Organization [facility]): WHO offers information about the virus outbreaks.  WHO also has travel advice. www.who.int  Current as of: April 1, 2020 Content Version: 12.4  © 2270-6537 [employer]. Care instructions adapted under license by your healthcare professional. If you have questions about a medical condition or this instruction, always ask your healthcare professional. [address] any warranty or liability for your use of this information. The discharge information has been reviewed with the patient and caregiver. Any questions and concerns from the patient and caregiver have been addressed. The patient and caregiver verbalized understanding. CONTENTS FOUND IN YOUR DISCHARGE ENVELOPE:  [x]     Surgeon and Hospital Discharge Instructions  [x]     [facility] Care Provider Card  []     Medication & Side Effect Guide            (your newly prescribed medications have been marked/highlighted showing the most common side effects from   the classes of drugs on your prescriptions)  []     Medication Prescription(s) x  ( [x] These have been sent electronically to your pharmacy by your surgeon,   - OR -       your surgeon has already provided these to you during a previous/pre-op office visit)  []     [address]  []     Physical Therapy Prescription  []     Follow-up Appointment Cards  []     Surgery-related Pictures/Media  []     Pain block and/or block with On-Q Catheter from Anesthesia Service (information included in your instructions above)  []     Medical device information sheets/pamphlets from their    []     School/work excuse note. []     /parent work excuse note. The following personal items collected during your admission are returned to you:   Dental Appliance: Dental Appliances: None  Vision: Visual Aid: None  Hearing Aid:    Jewelry: Jewelry: None  Clothing: Clothing: Other (comment) (street clothes to PACU locker)  Other Valuables:  Other Valuables: Cell Phone, Crutches  Valuables sent to safe:

## 2023-04-20 NOTE — ANESTHESIA POSTPROCEDURE EVALUATION
Procedure(s):  RIGHT ACHILLES REPAIR (GENERAL WITH LOCAL). general    Anesthesia Post Evaluation      Multimodal analgesia: multimodal analgesia used between 6 hours prior to anesthesia start to PACU discharge  Patient location during evaluation: PACU  Patient participation: complete - patient participated  Level of consciousness: awake and alert  Pain score: 0  Pain management: adequate  Airway patency: patent  Anesthetic complications: no  Cardiovascular status: blood pressure returned to baseline and hypertensive  Respiratory status: acceptable  Hydration status: acceptable  Comments: Patient seen and evaluated; no concerns. Post anesthesia nausea and vomiting:  none  Final Post Anesthesia Temperature Assessment:  Normothermia (36.0-37.5 degrees C)      INITIAL Post-op Vital signs:   Vitals Value Taken Time   /104 04/20/23 1441   Temp 36.4 °C (97.5 °F) 04/20/23 1336   Pulse 67 04/20/23 1441   Resp 14 04/20/23 1441   SpO2 94 % 04/20/23 1441   Vitals shown include unvalidated device data.

## 2023-04-20 NOTE — ANESTHESIA PREPROCEDURE EVALUATION
Relevant Problems   No relevant active problems       Anesthetic History   No history of anesthetic complications            Review of Systems / Medical History  Patient summary reviewed and nursing notes reviewed    Pulmonary  Within defined limits                 Neuro/Psych   Within defined limits           Cardiovascular  Within defined limits                Exercise tolerance: >4 METS     GI/Hepatic/Renal  Within defined limits              Endo/Other  Within defined limits           Other Findings              Physical Exam    Airway  Mallampati: I    Neck ROM: normal range of motion   Mouth opening: Normal     Cardiovascular    Rhythm: regular  Rate: normal         Dental  No notable dental hx       Pulmonary                 Abdominal  Abdominal exam normal       Other Findings            Anesthetic Plan    ASA: 1  Anesthesia type: general          Induction: Intravenous  Anesthetic plan and risks discussed with: Patient      Informed consent obtained.

## 2023-04-21 NOTE — OP NOTES
[facility] 80  OPERATIVE REPORT    Name:  Octavio Banks  MR#:  [id number]  :  1995  ACCOUNT #:  [de-identified]  DATE OF SERVICE:  2023    PREOPERATIVE DIAGNOSIS:  Right Achilles tendon rupture. POSTOPERATIVE DIAGNOSIS:  Right Achilles tendon rupture. PROCEDURE PERFORMED:  Right Achilles tendon repair. SURGEON:  Chaparrita Knox. MD Dixie    ASSISTANT:  Gabriel Adams PA-C    ANESTHESIA:  General with local.    COMPLICATIONS:  None. SPECIMENS REMOVED:  None. IMPLANTS:  Arthrex FiberWire. ESTIMATED BLOOD LOSS:  Less than 5 mL. DRAINS:  None. FINDINGS:  Midsubstance Achilles tendon rupture. TOURNIQUET TIME:  Thigh tourniquet at 300 mmHg for 55 minutes. INDICATIONS FOR PROCEDURE:  This is a 25-year-old male who suffered a midsubstance Achilles tendon rupture on approximately 2023. He presented in delayed fashion to me earlier this week and I offered both conservative and surgical management options to him. I discussed the risks of surgery which include but are not limited to complications of anesthesia including death, pain, bleeding, infection, damage to surrounding structures, re-rupture, wound healing problems, the need for further surgery, DVT, PE, stiffness, and weakness in the ankle and the leg. The patient verbalized understanding and elected to proceed with surgical intervention to potentially reduce the re-rupture risk and to potentially restore preinjury level push-off strength to the right lower extremity. DESCRIPTION OF PROCEDURE:  The correct patient, extremity and operation were identified in the preoperative holding area and I marked his right ankle. He was taken back to the operating room and placed successfully under general anesthesia. The right lower extremity was then exsanguinated with an Esmarch bandage and the tourniquet was raised. The tourniquet was raised after antibiotics had been infused.     The patient was then placed in the prone position and all the bony prominences were padded well. He was secured to the table with a safety strap. The right lower extremity was then prepped and draped in the usual sterile fashion and a preop time-out was called. Again, the correct patient, extremity and operation were identified, all parties were in agreement, and we proceeded with the operation. A longitudinal incision was made centered over the palpated defect in the Achilles tendon approximately 2 cm in length. Sharp dissection was carried down to the level of skin and meticulous blunt dissection was carried out digitally to free up the proximal stump. I grasped the stump with an Allis clamp and there was satisfactory travel of the stump to affect a satisfactory repair. The Arthrex pars jig was then inserted around the tendon and the needle was placed through the aiming arm and passed through the Achilles tendon in the proximal stump. The sutures were then shuttled with sequential passes of the needle to place the sutures through the proximal stump. The jig was then removed delivering the sutures through the wound. Both sutures were unlocked according to the technique guide and longitudinal traction on the sutures showed satisfactory travel of the proximal stump. Attention was then turned to the distal stump, and in the same fashion, the Arthrex pars jig was then placed in the wound around the distal stump of the Achilles tendon rupture. The needle was then placed through the aiming arm and passed through the Achilles tendon and the sutures were then sequentially passed. The jig was removed from the wound and this delivered the sutures. The blue suture was then locked according to the technique guide and longitudinal traction on the sutures showed excellent purchase in the distal stump and passive plantarflexion of the ankle.   The sutures were then tied sequentially after placing the ankle in maximum plantarflexion and held there. After tying the sutures, there was excellent apposition of the ruptured tendon ends and then a 0 Vicryl suture was used for an epitendinous reinforcement suture. The wound was then copiously irrigated with normal saline and palpation of the repair was satisfactory showing excellent apposition of the ruptured tendon ends. After the wound was copiously irrigated, it was closed in layers. During the procedure, Alexandro Romeo performed the duties of positioning, assistance with retraction of the limb and suture management as well as closure, dressing, and splint application. A sterile dressing was applied and a well-padded, well-molded short leg splint was applied to the right lower extremity and the tourniquet was dropped. The splint was placed with the ankle in resting plantarflexion. The patient was then awakened from anesthesia and taken to the recovery room in hemodynamically stable condition after he had been returned to the supine position. POSTOPERATIVE CARE:  He will be discharged from the PACU once he meets PACU criteria. He will be nonweightbearing to the right lower extremity. He will start aspirin 81 mg p.o. b.i.d. for DVT prophylaxis on the morning of postop day #1. He will follow up with Alexandro Romeo in the [facility] office as scheduled in two weeks, [phone].         Xiao Ledezma MD      [id number]  D:  04/20/2023 13:15  T:  04/20/2023 22:40  JOB #:  [id number]

## 2024-01-04 NOTE — ED NOTES
Patient (s)  given copy of dc instructions and 0 paper script(s) and 4 electronic scripts. Patient (s)  verbalized understanding of instructions and script (s). Patient given a current medication reconciliation form and verbalized understanding of their medications. Patient (s) verbalized understanding of the importance of discussing medications with  his or her physician or clinic they will be following up with. Patient alert and oriented and in no acute distress. Patient offered wheelchair from treatment area to hospital entrance, patient declines wheelchair.
never used

## 2024-07-10 ENCOUNTER — HOSPITAL ENCOUNTER (EMERGENCY)
Facility: HOSPITAL | Age: 29
Discharge: HOME | End: 2024-07-10
Payer: MEDICAID

## 2024-07-10 VITALS
WEIGHT: 190 LBS | SYSTOLIC BLOOD PRESSURE: 128 MMHG | BODY MASS INDEX: 25.18 KG/M2 | TEMPERATURE: 97.4 F | HEART RATE: 63 BPM | HEIGHT: 73 IN | DIASTOLIC BLOOD PRESSURE: 65 MMHG | RESPIRATION RATE: 18 BRPM | OXYGEN SATURATION: 96 %

## 2024-07-10 DIAGNOSIS — J06.9 ACUTE UPPER RESPIRATORY INFECTION: Primary | ICD-10-CM

## 2024-07-10 LAB — DEPRECATED S PYO AG THROAT QL EIA: NEGATIVE

## 2024-07-10 PROCEDURE — 99283 EMERGENCY DEPT VISIT LOW MDM: CPT

## 2024-07-10 PROCEDURE — 87070 CULTURE OTHR SPECIMN AEROBIC: CPT

## 2024-07-10 PROCEDURE — 87880 STREP A ASSAY W/OPTIC: CPT

## 2024-07-10 RX ORDER — ALBUTEROL SULFATE 90 UG/1
2 AEROSOL, METERED RESPIRATORY (INHALATION) EVERY 6 HOURS PRN
Qty: 18 G | Refills: 0 | Status: SHIPPED | OUTPATIENT
Start: 2024-07-10

## 2024-07-10 RX ORDER — AZITHROMYCIN 250 MG/1
TABLET, FILM COATED ORAL
Qty: 1 PACKET | Refills: 0 | Status: SHIPPED | OUTPATIENT
Start: 2024-07-10 | End: 2024-07-14

## 2024-07-10 RX ORDER — METHYLPREDNISOLONE 4 MG/1
TABLET ORAL
Qty: 1 KIT | Refills: 0 | Status: SHIPPED | OUTPATIENT
Start: 2024-07-10 | End: 2024-07-16

## 2024-07-10 RX ORDER — IBUPROFEN 600 MG/1
600 TABLET ORAL EVERY 8 HOURS PRN
Qty: 30 TABLET | Refills: 0 | Status: SHIPPED | OUTPATIENT
Start: 2024-07-10

## 2024-07-10 RX ORDER — GUAIFENESIN/DEXTROMETHORPHAN 100-10MG/5
10 SYRUP ORAL 3 TIMES DAILY PRN
Qty: 120 ML | Refills: 0 | Status: SHIPPED | OUTPATIENT
Start: 2024-07-10 | End: 2024-07-20

## 2024-07-10 ASSESSMENT — ENCOUNTER SYMPTOMS
ABDOMINAL PAIN: 0
WHEEZING: 1
COUGH: 1
BACK PAIN: 0
SORE THROAT: 1
SHORTNESS OF BREATH: 0

## 2024-07-10 ASSESSMENT — PAIN DESCRIPTION - ORIENTATION: ORIENTATION: RIGHT;LEFT

## 2024-07-10 ASSESSMENT — PAIN DESCRIPTION - LOCATION: LOCATION: THROAT

## 2024-07-10 ASSESSMENT — LIFESTYLE VARIABLES
HOW OFTEN DO YOU HAVE A DRINK CONTAINING ALCOHOL: NEVER
HOW MANY STANDARD DRINKS CONTAINING ALCOHOL DO YOU HAVE ON A TYPICAL DAY: PATIENT DOES NOT DRINK

## 2024-07-10 ASSESSMENT — PAIN DESCRIPTION - DESCRIPTORS: DESCRIPTORS: ACHING

## 2024-07-10 ASSESSMENT — PAIN - FUNCTIONAL ASSESSMENT: PAIN_FUNCTIONAL_ASSESSMENT: 0-10

## 2024-07-10 ASSESSMENT — PAIN SCALES - GENERAL: PAINLEVEL_OUTOF10: 8

## 2024-07-10 NOTE — ED NOTES
Discharge instructions were given to the patient by provider.     The patient left the Emergency Department alert and oriented and in no acute distress with 5 prescriptions. The patient was encouraged to call or return to the ED for worsening issues or problems and was encouraged to schedule a follow up appointment for continuing care.     Ambulation assessment completed before discharge.  Pt left Emergency Department ambulating at baseline with no ortho devices  Ortho device education: none    The patient verbalized understanding of discharge instructions and prescriptions, all questions were answered. The patient has no further concerns at this time.

## 2024-07-10 NOTE — ED NOTES
Pt presents to ED complaining of sore throat and cough and colds. Pt stated that he's been having cough for 6 days with yellowish mucus, pt also stated that it gives him chest discomfort when coughing. Pt stated that he had fever 1st 2 days and goes away. Pt also stated that he's having sore throat and headaches. Pt denies shortness of breath. Pt is alert and oriented x 4, RR even and unlabored, skin is warm and dry. Assesment completed and pt updated on plan of care.       Emergency Department Nursing Plan of Care       The Nursing Plan of Care is developed from the Nursing assessment and Emergency Department Attending provider initial evaluation.  The plan of care may be reviewed in the “ED Provider note”.    The Plan of Care was developed with the following considerations:   Presenting ambulatory assessment: Ambulating at baseline  Patient / Family readiness to learn indicated by: verbalized understanding  Persons(s) to be included in education: patient   Barriers to Learning/Limitations: None    Signed     SILVANA WU RN    7/10/2024   10:04 AM

## 2024-07-10 NOTE — ED PROVIDER NOTES
[facility] EMERGENCY DEPT  EMERGENCY DEPARTMENT ENCOUNTER       Pt Name: Bernardo Benjamin  MRN: [id number]  Birthdate 1995  Date of evaluation: 7/10/2024  Provider: JUANITA Marshall - CANDIS   PCP: Fabián gAarwal MD  Note Started: 3:52 PM 7/10/24     CHIEF COMPLAINT       Chief Complaint   Patient presents with    Sore Throat    Cough        HISTORY OF PRESENT ILLNESS: 1 or more elements      History Provided by: Patient   History is limited by: Nothing     Bernardo Benjamin is a 29 y.o. male who presents cc cough wheezing sore throat for the past 6 days.  States cough is productive for green sputum.  Denies ear pain denies runny nose denies abdominal pain nausea vomiting diarrhea.     Nursing Notes were all reviewed and agreed with or any disagreements were addressed in the HPI.     REVIEW OF SYSTEMS      Review of Systems   Constitutional:  Negative for fever.   HENT:  Positive for sore throat. Negative for congestion.    Eyes:  Negative for visual disturbance.   Respiratory:  Positive for cough and wheezing. Negative for shortness of breath.    Cardiovascular:  Negative for chest pain.   Gastrointestinal:  Negative for abdominal pain.   Musculoskeletal:  Negative for back pain and neck pain.   Skin:  Negative for rash.   Neurological:  Negative for dizziness, weakness and headaches.   All other systems reviewed and are negative.       Positives and Pertinent negatives as per HPI.    PAST HISTORY     Past Medical History:  No past medical history on file.    Past Surgical History:  No past surgical history on file.    Family History:  No family history on file.    Social History:  Social History     Tobacco Use    Smoking status: Never    Smokeless tobacco: Never   Substance Use Topics    Alcohol use: No    Drug use: Not Currently     Types: Marijuana (Weed)       Allergies:  Allergies   Allergen Reactions    Shellfish Allergy Hives       CURRENT MEDICATIONS      Discharge Medication List as of 7/10/2024 10:36

## 2024-07-12 LAB
BACTERIA SPEC CULT: NORMAL
SERVICE CMNT-IMP: NORMAL
